# Patient Record
Sex: MALE | Race: WHITE | NOT HISPANIC OR LATINO | Employment: FULL TIME | URBAN - METROPOLITAN AREA
[De-identification: names, ages, dates, MRNs, and addresses within clinical notes are randomized per-mention and may not be internally consistent; named-entity substitution may affect disease eponyms.]

---

## 2020-12-26 ENCOUNTER — APPOINTMENT (OUTPATIENT)
Dept: RADIOLOGY | Facility: MEDICAL CENTER | Age: 30
End: 2020-12-26
Attending: EMERGENCY MEDICINE
Payer: MEDICAID

## 2020-12-26 ENCOUNTER — HOSPITAL ENCOUNTER (EMERGENCY)
Facility: MEDICAL CENTER | Age: 30
End: 2020-12-26
Attending: EMERGENCY MEDICINE
Payer: MEDICAID

## 2020-12-26 VITALS
TEMPERATURE: 97.9 F | SYSTOLIC BLOOD PRESSURE: 114 MMHG | DIASTOLIC BLOOD PRESSURE: 70 MMHG | WEIGHT: 205 LBS | OXYGEN SATURATION: 90 % | RESPIRATION RATE: 13 BRPM | HEART RATE: 90 BPM | HEIGHT: 78 IN | BODY MASS INDEX: 23.72 KG/M2

## 2020-12-26 DIAGNOSIS — S12.9XXA CLOSED FRACTURE OF TRANSVERSE PROCESS OF CERVICAL VERTEBRA, INITIAL ENCOUNTER (HCC): ICD-10-CM

## 2020-12-26 DIAGNOSIS — S12.8XXA FRACTURE OF LAMINA OF CERVICAL VERTEBRA, INITIAL ENCOUNTER (HCC): ICD-10-CM
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
## 2020-12-26 LAB
ABO GROUP BLD: NORMAL
ALBUMIN SERPL BCP-MCNC: 4.8 G/DL (ref 3.2–4.9)
ALBUMIN/GLOB SERPL: 1.7 G/DL
ALP SERPL-CCNC: 67 U/L (ref 30–99)
ALT SERPL-CCNC: 52 U/L (ref 2–50)
ANION GAP SERPL CALC-SCNC: 14 MMOL/L (ref 7–16)
APTT PPP: 28.3 SEC (ref 24.7–36)
AST SERPL-CCNC: 41 U/L (ref 12–45)
BILIRUB SERPL-MCNC: 0.2 MG/DL (ref 0.1–1.5)
BLD GP AB SCN SERPL QL: NORMAL
BUN SERPL-MCNC: 12 MG/DL (ref 8–22)
CALCIUM SERPL-MCNC: 9.2 MG/DL (ref 8.5–10.5)
CHLORIDE SERPL-SCNC: 105 MMOL/L (ref 96–112)
CO2 SERPL-SCNC: 24 MMOL/L (ref 20–33)
CREAT SERPL-MCNC: 1.01 MG/DL (ref 0.5–1.4)
ERYTHROCYTE [DISTWIDTH] IN BLOOD BY AUTOMATED COUNT: 43.5 FL (ref 35.9–50)
ETHANOL BLD-MCNC: 296.1 MG/DL (ref 0–10)
GLOBULIN SER CALC-MCNC: 2.8 G/DL (ref 1.9–3.5)
GLUCOSE SERPL-MCNC: 107 MG/DL (ref 65–99)
HCT VFR BLD AUTO: 50.7 % (ref 42–52)
HGB BLD-MCNC: 17 G/DL (ref 14–18)
INR PPP: 1 (ref 0.87–1.13)
MCH RBC QN AUTO: 30 PG (ref 27–33)
MCHC RBC AUTO-ENTMCNC: 33.5 G/DL (ref 33.7–35.3)
MCV RBC AUTO: 89.4 FL (ref 81.4–97.8)
PLATELET # BLD AUTO: 186 K/UL (ref 164–446)
PMV BLD AUTO: 10.8 FL (ref 9–12.9)
POTASSIUM SERPL-SCNC: 3.5 MMOL/L (ref 3.6–5.5)
PROT SERPL-MCNC: 7.6 G/DL (ref 6–8.2)
PROTHROMBIN TIME: 13.5 SEC (ref 12–14.6)
RBC # BLD AUTO: 5.67 M/UL (ref 4.7–6.1)
RH BLD: NORMAL
SODIUM SERPL-SCNC: 143 MMOL/L (ref 135–145)
WBC # BLD AUTO: 3.2 K/UL (ref 4.8–10.8)

## 2020-12-26 PROCEDURE — 85027 COMPLETE CBC AUTOMATED: CPT

## 2020-12-26 PROCEDURE — 80053 COMPREHEN METABOLIC PANEL: CPT

## 2020-12-26 PROCEDURE — 85610 PROTHROMBIN TIME: CPT

## 2020-12-26 PROCEDURE — 71045 X-RAY EXAM CHEST 1 VIEW: CPT

## 2020-12-26 PROCEDURE — 70450 CT HEAD/BRAIN W/O DYE: CPT

## 2020-12-26 PROCEDURE — 72125 CT NECK SPINE W/O DYE: CPT

## 2020-12-26 PROCEDURE — 72131 CT LUMBAR SPINE W/O DYE: CPT

## 2020-12-26 PROCEDURE — 86900 BLOOD TYPING SEROLOGIC ABO: CPT

## 2020-12-26 PROCEDURE — 80307 DRUG TEST PRSMV CHEM ANLYZR: CPT

## 2020-12-26 PROCEDURE — 90715 TDAP VACCINE 7 YRS/> IM: CPT | Performed by: EMERGENCY MEDICINE

## 2020-12-26 PROCEDURE — 72128 CT CHEST SPINE W/O DYE: CPT

## 2020-12-26 PROCEDURE — 700111 HCHG RX REV CODE 636 W/ 250 OVERRIDE (IP): Performed by: EMERGENCY MEDICINE

## 2020-12-26 PROCEDURE — 90471 IMMUNIZATION ADMIN: CPT

## 2020-12-26 PROCEDURE — 73030 X-RAY EXAM OF SHOULDER: CPT | Mod: LT

## 2020-12-26 PROCEDURE — 99285 EMERGENCY DEPT VISIT HI MDM: CPT

## 2020-12-26 PROCEDURE — 86901 BLOOD TYPING SEROLOGIC RH(D): CPT

## 2020-12-26 PROCEDURE — 96374 THER/PROPH/DIAG INJ IV PUSH: CPT

## 2020-12-26 PROCEDURE — 700117 HCHG RX CONTRAST REV CODE 255: Performed by: EMERGENCY MEDICINE

## 2020-12-26 PROCEDURE — 305948 HCHG GREEN TRAUMA ACT PRE-NOTIFY NO CC

## 2020-12-26 PROCEDURE — 85730 THROMBOPLASTIN TIME PARTIAL: CPT

## 2020-12-26 PROCEDURE — 71260 CT THORAX DX C+: CPT

## 2020-12-26 PROCEDURE — 86850 RBC ANTIBODY SCREEN: CPT

## 2020-12-26 RX ORDER — HALOPERIDOL 5 MG/ML
5 INJECTION INTRAMUSCULAR
Status: DISCONTINUED | OUTPATIENT
Start: 2020-12-26 | End: 2020-12-26 | Stop reason: HOSPADM

## 2020-12-26 RX ORDER — MORPHINE SULFATE 4 MG/ML
4 INJECTION, SOLUTION INTRAMUSCULAR; INTRAVENOUS
Status: DISCONTINUED | OUTPATIENT
Start: 2020-12-26 | End: 2020-12-26 | Stop reason: HOSPADM

## 2020-12-26 RX ORDER — IBUPROFEN 600 MG/1
600 TABLET ORAL EVERY 6 HOURS PRN
Qty: 30 TAB | Refills: 0 | Status: SHIPPED | OUTPATIENT
Start: 2020-12-26

## 2020-12-26 RX ORDER — ACETAMINOPHEN 500 MG
500-1000 TABLET ORAL EVERY 6 HOURS PRN
Qty: 30 TAB | Refills: 0 | Status: SHIPPED | OUTPATIENT
Start: 2020-12-26

## 2020-12-26 RX ADMIN — CLOSTRIDIUM TETANI TOXOID ANTIGEN (FORMALDEHYDE INACTIVATED), CORYNEBACTERIUM DIPHTHERIAE TOXOID ANTIGEN (FORMALDEHYDE INACTIVATED), BORDETELLA PERTUSSIS TOXOID ANTIGEN (GLUTARALDEHYDE INACTIVATED), BORDETELLA PERTUSSIS FILAMENTOUS HEMAGGLUTININ ANTIGEN (FORMALDEHYDE INACTIVATED), BORDETELLA PERTUSSIS PERTACTIN ANTIGEN, AND BORDETELLA PERTUSSIS FIMBRIAE 2/3 ANTIGEN 0.5 ML: 5; 2; 2.5; 5; 3; 5 INJECTION, SUSPENSION INTRAMUSCULAR at 07:53

## 2020-12-26 RX ADMIN — IOHEXOL 100 ML: 350 INJECTION, SOLUTION INTRAVENOUS at 04:30

## 2020-12-26 RX ADMIN — MORPHINE SULFATE 4 MG: 4 INJECTION INTRAVENOUS at 08:03

## 2020-12-26 NOTE — ED NOTES
Assumed care of patient at this time from BAILEY Cabrales.  Traction to place [place] J collar, patient resting comfortably at this time, will continue to monitor
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
## 2020-12-26 NOTE — DISCHARGE INSTRUCTIONS
Please discuss the following findings with your neurosurgeon:  DX-SHOULDER 2+ LEFT   Final Result      No evidence of acute fracture or dislocation.      CT-CHEST,ABDOMEN,PELVIS WITH   Final Result      No solid organ or vascular injury is identified.      No pulmonary contusion or pneumothorax is seen.      Soft tissue density in the anterior mediastinum most likely represents residual thymus as opposed to a small anterior mediastinal hematoma      Fracture of the C5 lamina on the right is partially imaged.      Hyperenhancing lesion within the left lobe of the liver may represent a hemangioma or FNH. Further evaluation can be performed with hepatic protocol MRI.      CT-LSPINE W/O PLUS RECONS   Final Result      No fracture or subluxation is seen in the lumbar spine.      CT-TSPINE W/O PLUS RECONS   Final Result      No fracture or subluxation is seen in the thoracic spine.         CT-CSPINE WITHOUT PLUS RECONS   Final Result      Fracture involving the lamina, transverse process and inferior facet of C5 on the right. Slight widening of the facet joint at C5/C6 on the right.      CT-HEAD W/O   Final Result      No acute intracranial abnormality is identified.      DX-CHEST-LIMITED (1 VIEW)   Final Result      No acute cardiopulmonary process is identified.
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
## 2020-12-26 NOTE — ED NOTES
Traction nurses at bedside to place [place] J neck collar.  Patient awake and talking, requesting to have his girlfriend Negra ([phone]) called.

## 2020-12-26 NOTE — ED NOTES
Patient's mother and transport here for discharge.  Patient dressed and sitting at side of bed, [place] MIGUEL neck brace in place.  Discharge instructions, pain medications and proper care of neck brace reviewed with patient and family.  All questions answered, patient was able to ambulate off unit in NAD with family member

## 2021-05-03 ENCOUNTER — TELEPHONE (OUTPATIENT)
Dept: SCHEDULING | Facility: IMAGING CENTER | Age: 31
End: 2021-05-03

## 2021-05-11 ENCOUNTER — APPOINTMENT (OUTPATIENT)
Dept: MEDICAL GROUP | Facility: LAB | Age: 31
End: 2021-05-11
Payer: MEDICAID

## 2022-05-19 ENCOUNTER — OFFICE VISIT (OUTPATIENT)
Dept: MEDICAL GROUP | Facility: IMAGING CENTER | Age: 32
End: 2022-05-19
Payer: COMMERCIAL

## 2022-05-19 ENCOUNTER — HOSPITAL ENCOUNTER (OUTPATIENT)
Facility: MEDICAL CENTER | Age: 32
End: 2022-05-19
Payer: COMMERCIAL

## 2022-05-19 ENCOUNTER — TELEPHONE (OUTPATIENT)
Dept: SCHEDULING | Facility: IMAGING CENTER | Age: 32
End: 2022-05-19

## 2022-05-19 VITALS
WEIGHT: 205 LBS | TEMPERATURE: 98.4 F | HEIGHT: 78 IN | HEART RATE: 93 BPM | DIASTOLIC BLOOD PRESSURE: 62 MMHG | RESPIRATION RATE: 16 BRPM | OXYGEN SATURATION: 96 % | SYSTOLIC BLOOD PRESSURE: 116 MMHG | BODY MASS INDEX: 23.72 KG/M2

## 2022-05-19 DIAGNOSIS — Z00.00 WELLNESS EXAMINATION: ICD-10-CM

## 2022-05-19 DIAGNOSIS — J02.9 PHARYNGITIS, UNSPECIFIED ETIOLOGY: ICD-10-CM

## 2022-05-19 DIAGNOSIS — J30.2 SEASONAL ALLERGIES: ICD-10-CM

## 2022-05-19 DIAGNOSIS — R09.81 NASAL CONGESTION: ICD-10-CM

## 2022-05-19 DIAGNOSIS — J06.9 UPPER RESPIRATORY TRACT INFECTION, UNSPECIFIED TYPE: ICD-10-CM

## 2022-05-19 DIAGNOSIS — R68.89 FLU-LIKE SYMPTOMS: ICD-10-CM

## 2022-05-19 PROBLEM — R52 BODY ACHES: Status: ACTIVE | Noted: 2022-05-19

## 2022-05-19 LAB
COVID ORDER STATUS COVID19: NORMAL
EXTERNAL QUALITY CONTROL: NORMAL
FLUAV+FLUBV AG SPEC QL IA: NEGATIVE
HETEROPH AB SER QL LA: NEGATIVE
INT CON NEG: NORMAL
INT CON POS: NORMAL
S PYO AG THROAT QL: NEGATIVE
SARS-COV+SARS-COV-2 AG RESP QL IA.RAPID: NEGATIVE

## 2022-05-19 PROCEDURE — U0003 INFECTIOUS AGENT DETECTION BY NUCLEIC ACID (DNA OR RNA); SEVERE ACUTE RESPIRATORY SYNDROME CORONAVIRUS 2 (SARS-COV-2) (CORONAVIRUS DISEASE [COVID-19]), AMPLIFIED PROBE TECHNIQUE, MAKING USE OF HIGH THROUGHPUT TECHNOLOGIES AS DESCRIBED BY CMS-2020-01-R: HCPCS

## 2022-05-19 PROCEDURE — 87880 STREP A ASSAY W/OPTIC: CPT

## 2022-05-19 PROCEDURE — U0005 INFEC AGEN DETEC AMPLI PROBE: HCPCS

## 2022-05-19 PROCEDURE — 87077 CULTURE AEROBIC IDENTIFY: CPT

## 2022-05-19 PROCEDURE — 86308 HETEROPHILE ANTIBODY SCREEN: CPT

## 2022-05-19 PROCEDURE — 99203 OFFICE O/P NEW LOW 30 MIN: CPT

## 2022-05-19 PROCEDURE — 87426 SARSCOV CORONAVIRUS AG IA: CPT

## 2022-05-19 PROCEDURE — 87804 INFLUENZA ASSAY W/OPTIC: CPT

## 2022-05-19 PROCEDURE — 87070 CULTURE OTHR SPECIMN AEROBIC: CPT

## 2022-05-19 ASSESSMENT — ANXIETY QUESTIONNAIRES
4. TROUBLE RELAXING: NOT AT ALL
2. NOT BEING ABLE TO STOP OR CONTROL WORRYING: NOT AT ALL
1. FEELING NERVOUS, ANXIOUS, OR ON EDGE: NOT AT ALL
GAD7 TOTAL SCORE: 0
5. BEING SO RESTLESS THAT IT IS HARD TO SIT STILL: NOT AT ALL
3. WORRYING TOO MUCH ABOUT DIFFERENT THINGS: NOT AT ALL
6. BECOMING EASILY ANNOYED OR IRRITABLE: NOT AT ALL
7. FEELING AFRAID AS IF SOMETHING AWFUL MIGHT HAPPEN: NOT AT ALL

## 2022-05-19 ASSESSMENT — PATIENT HEALTH QUESTIONNAIRE - PHQ9: CLINICAL INTERPRETATION OF PHQ2 SCORE: 0

## 2022-05-19 ASSESSMENT — FIBROSIS 4 INDEX: FIB4 SCORE: 0.98

## 2022-05-19 NOTE — PATIENT INSTRUCTIONS
My recommendations for an upper respiratory infection:  - Use a humidifier, especially at night. Cold or warm water humidifiers have the same effect.  - Aaron Med squeeze bottle sinus rinses or plain nasal saline twice a day.  - Hot tea + honey + fresh lemon juice  - Honey by itself has been shown to help provide cough relief  - Frequent hand washing, rest, hydration  - OTC tylenol and ibuprofen as needed for pain and fever  - OTC throat lozenges  - OTC throat sprays    Follow up with primary care provider. Urgently for worsening symptoms, persistent fevers, facial swelling, visual changes, weakness, elevated heart rate, stiff neck, prolonged cough, persistent wheezing, leg swelling, or any other concerns. Seek emergency medical care immediately for: Trouble breathing, persistent pain or pressure in the chest, confusion, inability to wake or stay awake, bluish lips or face, persistent tachycardia (fast heart rate), prolonged dizziness, persistent high grade fevers.

## 2022-05-19 NOTE — LETTER
May 19, 2022         Patient: Willem Flowers   YOB: 1990   Date of Visit: 5/19/2022           To Whom it May Concern:    Willem Flowers was seen in my clinic on 5/19/2022.     If you have any questions or concerns, please don't hesitate to call.        Sincerely,           JESSIE Francisco  Electronically Signed

## 2022-05-19 NOTE — PROGRESS NOTES
CC:  Establish care    HISTORY OF THE PRESENT ILLNESS: Patient is a 32 y.o. male. This pleasant patient is here today to establish care    Patient presents today with a 5 day history of fatigue, lethargy, body aches, night sweats, and headache. On Tuesday, patient developed sinus pressure, sinus congestion, rhinorrhea, and sore throat. Patient reports of having worsening symptoms particularly his sore throat. He states that it is becoming painful to swallow. Patient admits to being active and has gone on walks and has gone to the gym for a light work-out hoping this would help. Patient admits to not drinking enough water which contributed to him feeling worse. His symptoms are bothersome enough that it is impacting his ability to sleep.   Patient reports of his parents whom he lives with of having Covid.   Patient uses Ibuprofen and warm water and salt rinses, and Nedi Pot sinus rinses which provided some relief.   Patient denies fevers, malaise, facial pain, cough, shortness of breath, difficulty breathing, chest pain, dizziness, syncope, n/v, adb pain, bowel changes.    Patient endorses to taking Claritin for seasonal allergies which are not helping. He is requesting allergy testing.       Allergies: Patient has no known allergies.    Current Outpatient Medications Ordered in Epic   Medication Sig Dispense Refill   • acetaminophen (TYLENOL) 500 MG Tab Take 1-2 Tabs by mouth every 6 hours as needed. 30 Tab 0   • ibuprofen (MOTRIN) 600 MG Tab Take 1 Tab by mouth every 6 hours as needed. 30 Tab 0     No current Epic-ordered facility-administered medications on file.       History reviewed. No pertinent past medical history.    History reviewed. No pertinent surgical history.    Social History     Tobacco Use   • Smoking status: Former Smoker     Quit date:      Years since quittin.3   • Smokeless tobacco: Never Used   Vaping Use   • Vaping Use: Former   • Quit date: 2021   • Substances: Nicotine,  "Flavoring   • Devices: Disposable   Substance Use Topics   • Alcohol use: Not Currently   • Drug use: Not Currently     Frequency: 2.0 times per week     Types: Inhaled, Marijuana     Comment: \"weed\"       Social History     Social History Narrative   • Not on file       Family History   Problem Relation Age of Onset   • Hypertension Father    • Blood Disease Father    • No Known Problems Brother    • No Known Problems Brother    • Breast Cancer Maternal Grandmother    • Dementia Maternal Grandfather    • Blood Disease Paternal Grandmother    • No Known Problems Paternal Grandfather    • No Known Problems Daughter    • Colorectal Cancer Son    • Peritoneal Cancer Son    • Tubal Cancer Son    • Ovarian Cancer Son        ROS:     - Constitutional: Negative for fever, unexpected weight change, + chills, sweats, fatigue/generalized weakness.     - HEENT: no vision changes, no hearing changes, ear pain, + headaches, rhinorrhea, sinus congestion, sore throat, neck pain    - Respiratory: Negative for cough, sputum production, chest congestion, dyspnea, wheezing, and crackles.      - Cardiovascular: Negative for chest pain, palpitations, orthopnea, and bilateral lower extremity edema.     - Gastrointestinal: Negative for heartburn, nausea, vomiting, abdominal pain, hematochezia, melena, diarrhea, constipation    - Genitourinary: Negative for dysuria, polyuria, hematuria, pyuria, urinary urgency, and urinary incontinence.     - Musculoskeletal: Negative for myalgias, back pain, and joint pain.     - Skin: Negative for rash, itching, cyanotic skin color change.     - Neurological: Negative for dizziness, tingling, tremors, focal sensory deficit, focal weakness and headaches.     - Endo/Heme/Allergies: Does not bruise/bleed easily.     - Psychiatric/Behavioral: Negative for depression, suicidal/homicidal ideation and memory loss.        - NOTE: All other systems reviewed and are negative, except as in HPI.      .  Exam: BP " "116/62 (BP Location: Left arm, Patient Position: Sitting, BP Cuff Size: Adult)   Pulse 93   Temp 36.9 °C (98.4 °F) (Temporal)   Resp 16   Ht 1.981 m (6' 6\")   Wt 93 kg (205 lb)   SpO2 96%  Body mass index is 23.69 kg/m².    General: Normal appearing. No distress.  HEENT: Normocephalic. Eyes conjunctiva clear lids without ptosis, pupils equal and reactive to light accommodation, ears normal shape and contour, canals are clear bilaterally, tympanic membranes are benign, +mucopurulent sinus drainage,  oropharynx is with erythema, edema or some white patches.   Neck: Supple without JVD. Thyroid is not enlarged.  Pulmonary: Clear to ausculation.  Normal effort. No rales, ronchi, or wheezing.  Cardiovascular: Regular rate and rhythm without murmur. Carotid and radial pulses are intact and equal bilaterally.  Abdomen: Soft, nontender, nondistended. Normal bowel sounds.  Neurologic: Grossly nonfocal  Lymph: Anterior and posterior cervial lymph nodes are palpable  Skin: Warm and dry.  No obvious lesions.  Musculoskeletal: Normal gait. No extremity cyanosis, clubbing, or edema.  Psych: Normal mood and affect. Alert and oriented x3. Judgment and insight is normal.    Please note that this dictation was created using voice recognition software. I have made every reasonable attempt to correct obvious errors, but I expect that there are errors of grammar and possibly content that I did not discover before finalizing the note.      Assessment/Plan    32 y.o. male with the following -    1. Wellness examination  Patient here to establish care. Patient has not had any routine lab work in the past. Patient does not have any significant personal or family history but would like to get some labs done. Discussed this with him, patient agreeable to this but will also check with insurance first prior to getting labs drawn.    - CBC WITH DIFFERENTIAL; Future  - Comp Metabolic Panel; Future  - Lipid Profile; Future  - VITAMIN D,25 " HYDROXY; Future  - HEMOGLOBIN A1C; Future    2. Upper respiratory tract infection, unspecified type  4. Pharyngitis  3. Flu-like symptoms  5. Nasal congestion  New issue with worsening sore throat. History of recent exposure to Covid. Patient's vital signs are within stable range. Patient appears nontoxic.  Differentials viral URI, Covid, strep pharyngitis, Mono, Influenza. All tests negative in office. Will send for COVID PCR and throat culture.  If throat culture comes back positive with strep, will start ABX per protocol.   In the meantime, I recommend supportive measures including humidifier, warm salt water gargles, over-the-counter Cepacol throat lozenges, throat sprays, nasal saline rinses, rest  and increased fluids. Advised patient to take OTC immune booster supplementation. Take Tylenol or Ibuprofen as needed for pain and fever relief.   Pt was encouraged to seek treatment back in the ER or urgent care for worsening symptoms,  fever greater than 100.5, wheezes or shortness of breath.    - POCT Influenza A/B  - POCT Rapid Strep A  - POCT SARS-COV Antigen ALISIA Manual Result  - POCT Mononucleosis (mono)  - CULTURE THROAT; Future  - SARS-CoV-2, PCR (In-House); Future    6. Seasonal allergies  Established issue. Unknown triggers. Patient requesting allergy testing for this to find out exact triggers.    - Referral to Allergy      Referral for genetic research was offered. Patient declined.    Return in about 1 year (around 5/19/2023), or if symptoms worsen or fail to improve.    Please note that this dictation was created using voice recognition software. I have made every reasonable attempt to correct obvious errors, but I expect that there are errors of grammar and possibly content that I did not discover before finalizing the note.

## 2022-05-20 LAB
SARS-COV-2 RNA RESP QL NAA+PROBE: NOTDETECTED
SPECIMEN SOURCE: NORMAL

## 2022-05-22 ENCOUNTER — APPOINTMENT (OUTPATIENT)
Dept: URGENT CARE | Facility: CLINIC | Age: 32
End: 2022-05-22
Payer: COMMERCIAL

## 2022-05-22 LAB
BACTERIA SPEC RESP CULT: NORMAL
SIGNIFICANT IND 70042: NORMAL
SITE SITE: NORMAL
SOURCE SOURCE: NORMAL

## 2022-05-23 ENCOUNTER — OFFICE VISIT (OUTPATIENT)
Dept: MEDICAL GROUP | Facility: IMAGING CENTER | Age: 32
End: 2022-05-23
Payer: COMMERCIAL

## 2022-05-23 ENCOUNTER — TELEPHONE (OUTPATIENT)
Dept: MEDICAL GROUP | Facility: IMAGING CENTER | Age: 32
End: 2022-05-23

## 2022-05-23 VITALS
TEMPERATURE: 99.7 F | DIASTOLIC BLOOD PRESSURE: 62 MMHG | OXYGEN SATURATION: 96 % | RESPIRATION RATE: 16 BRPM | HEIGHT: 78 IN | SYSTOLIC BLOOD PRESSURE: 102 MMHG | HEART RATE: 97 BPM | BODY MASS INDEX: 23.72 KG/M2 | WEIGHT: 205 LBS

## 2022-05-23 DIAGNOSIS — J02.9 PHARYNGITIS, UNSPECIFIED ETIOLOGY: ICD-10-CM

## 2022-05-23 DIAGNOSIS — R05.9 COUGH: ICD-10-CM

## 2022-05-23 DIAGNOSIS — R53.83 OTHER FATIGUE: ICD-10-CM

## 2022-05-23 PROCEDURE — 99213 OFFICE O/P EST LOW 20 MIN: CPT

## 2022-05-23 RX ORDER — AMOXICILLIN AND CLAVULANATE POTASSIUM 875; 125 MG/1; MG/1
1 TABLET, FILM COATED ORAL 2 TIMES DAILY
Qty: 20 TABLET | Refills: 0 | Status: SHIPPED | OUTPATIENT
Start: 2022-05-23 | End: 2022-06-02

## 2022-05-23 ASSESSMENT — FIBROSIS 4 INDEX: FIB4 SCORE: 0.98

## 2022-05-23 ASSESSMENT — PAIN SCALES - GENERAL: PAINLEVEL: 5=MODERATE PAIN

## 2022-05-23 NOTE — PROGRESS NOTES
"Subjective:     CC:   Chief Complaint   Patient presents with   • Pharyngitis     X 2 weeks,not getting better, cousin tested for Strep C    • Congestion     In chest        HPI:   Willem presents today to discuss:    Patient reports of ongoing fatigue, sinus congestion, headache, sore throat, and now with productive cough. Patient states productive cough started this am. However, his most bothersome symptom is the peristent sore throat. Patient reports of painful swallowing; however, denies having difficulty swallowing. Patient's sore throat is worse immediately after awakening and slightly improves towards the end of the day. Patient now having some bilateral ear pain \"feels like pulsating.\" Patient unable to state whether he's had fever or chills due to daily use of Ibuprofen and Tylenol. Patient also reports that when throat culture was obtained last office visit that the left side of his throat was swabbed not the middle where most of the \"white patches were.\"  Patient endorses to using nasal rinses, day/nightQuil, Ibuprofen and Tylenol daily which helps. Patient using warm and salt water gargles without relief.   Patient denies fevers, chills, neck pain or stiffness, facial pain, changes in hearing, tinnitus, trismus, difficulty swallowing, drooling, voice changes, difficulty breathing, shortness of breath, pleuritic pain, chest pain, palpitations, dizziness or syncope.      No past medical history on file.  Family History   Problem Relation Age of Onset   • Hypertension Father    • Blood Disease Father    • No Known Problems Brother    • No Known Problems Brother    • Breast Cancer Maternal Grandmother    • Dementia Maternal Grandfather    • Blood Disease Paternal Grandmother    • No Known Problems Paternal Grandfather    • No Known Problems Daughter    • Colorectal Cancer Son    • Peritoneal Cancer Son    • Tubal Cancer Son    • Ovarian Cancer Son      No past surgical history on file.  Social History " "    Tobacco Use   • Smoking status: Former Smoker     Quit date:      Years since quittin.3   • Smokeless tobacco: Never Used   Vaping Use   • Vaping Use: Former   • Quit date: 2021   • Substances: Nicotine, Flavoring   • Devices: Disposable   Substance Use Topics   • Alcohol use: Not Currently   • Drug use: Not Currently     Frequency: 2.0 times per week     Types: Inhaled, Marijuana     Comment: \"weed\"     Social History     Social History Narrative   • Not on file     Current Outpatient Medications Ordered in Epic   Medication Sig Dispense Refill   • amoxicillin-clavulanate (AUGMENTIN) 875-125 MG Tab Take 1 Tablet by mouth 2 times a day for 10 days. 20 Tablet 0   • acetaminophen (TYLENOL) 500 MG Tab Take 1-2 Tabs by mouth every 6 hours as needed. 30 Tab 0   • ibuprofen (MOTRIN) 600 MG Tab Take 1 Tab by mouth every 6 hours as needed. 30 Tab 0     No current Epic-ordered facility-administered medications on file.     Patient has no known allergies.    ROS: see hpi  Gen: no fevers/chills, +fatigue  Pulm: no sob, +cough, sore throat, rhinorrhea, nasal congestion  CV: no chest pain, no palpitations, no edema  GI: no nausea/vomiting, no diarrhea  Skin: no rash    Objective:   Exam:  /62   Pulse 97   Temp 37.6 °C (99.7 °F)   Resp 16   Ht 1.981 m (6' 6\")   Wt 93 kg (205 lb)   SpO2 96%   BMI 23.69 kg/m²    Body mass index is 23.69 kg/m².    Gen: Alert and oriented, No apparent distress.  HEENT: Head atraumatic, normocephalic. Pupils equal and round. +mucopurulent sinus drainage,  oropharynx is with erythema, edema, and white patches with scant exudate in the posterior pharynx.  Ear canals with minimal ear cerumen, bilaterally. TM benign, bilaterally.  Neck: Neck is supple. Anterior and posterior cervial lymph nodes are palpable  Lungs: Normal effort, CTA bilaterally, no wheezes, rhonchi, or rales  CV: Regular rate and rhythm. No murmurs, rubs, or gallops.  ABD: +BS. Non-tender, non-distended. No " rebound, rigidity, or guarding.  Ext: No clubbing, cyanosis, edema.    Assessment & Plan:     32 y.o. male with the following -     1. Pharyngitis, unspecified etiology  3. Cough  Persistent issue x 8 days remains unchanged with supportive measures.   Covid PCR was negative. Throat culture was negative. Patient reports throat culture not swabbed appropriately.  Physical examination likely pharyngitis. DD: pharyngitis, viral URI, sinusitis, bronchitis, peritonsillar cellulitis. Patient non-toxic appearing. Temp 99.7 in office which is elevated compared to previous office visit. Will treat empirically with oral antibiotics, side effects discussed. Encouraged to increase fluid intake, continue with supportive measures. If symptoms do not improve, will obtain another throat culture. Advised to follow up if symptoms do not improve. ER symptoms discussed.     - amoxicillin-clavulanate (AUGMENTIN) 875-125 MG Tab; Take 1 Tablet by mouth 2 times a day for 10 days.  Dispense: 20 Tablet; Refill: 0    2. Other fatigue  Ongoing x 8 days. Likely related to above issue. Lab work still pending, advised patient to complete.    Medical Decision Making/Course:  In the course of preparing for this visit with review of the pertinent past medical history, recent and past clinic visits, current medications, and performing chart, immunization, medical history and medication reconciliation, and in the further course of obtaining the current history pertinent to the clinic visit today, performing an exam and evaluation, ordering and independently evaluating labs, tests, and/or procedures, prescribing any recommended new medications as noted above, providing any pertinent counseling and education and recommending further coordination of care. This was discussed with patient in a shared-decision making conversation, and they understand and agreed with plan of care.    Return if symptoms worsen or fail to improve.    JESSIE Francisco    [facility]    Please note that this dictation was created using voice recognition software. I have made every reasonable attempt to correct obvious errors, but I expect that there are errors of grammar and possibly content that I did not discover before finalizing the note.

## 2022-05-23 NOTE — TELEPHONE ENCOUNTER
----- Message from Kailey Jalloh sent at 5/23/2022 10:09 AM PDT -----  Regarding: Rx pharmacy  Pt left stating that his pharmacy needs to be switched to the [facility] at [id number] [address] [place]. Please send his rx there for amoxicilin    Thank you

## 2022-05-23 NOTE — LETTER
[facility]  [address]  [place] [address]     May 23, 2022    Patient: Willem Flowers   YOB: 1990   Date of Visit: 5/23/2022       To Whom It May Concern:    Willem Flowers was seen and treated in our department on 5/23/2022. Patient is requiring antibiotic treatment for this. Patient requires plenty of rest to recover. Patient may return to work 5/26/2022 as long as you don't develop any fevers. If you have any questions, please call us at [phone]    Sincerely,     ANJELICA Francisco.

## 2023-02-14 ENCOUNTER — OFFICE VISIT (OUTPATIENT)
Dept: MEDICAL GROUP | Facility: IMAGING CENTER | Age: 33
End: 2023-02-14
Payer: COMMERCIAL

## 2023-02-14 VITALS
SYSTOLIC BLOOD PRESSURE: 116 MMHG | BODY MASS INDEX: 23.6 KG/M2 | HEART RATE: 63 BPM | WEIGHT: 204 LBS | DIASTOLIC BLOOD PRESSURE: 74 MMHG | OXYGEN SATURATION: 97 % | RESPIRATION RATE: 16 BRPM | TEMPERATURE: 98.1 F | HEIGHT: 78 IN

## 2023-02-14 DIAGNOSIS — K62.89 RECTAL PAIN: ICD-10-CM

## 2023-02-14 DIAGNOSIS — Z00.00 WELLNESS EXAMINATION: ICD-10-CM

## 2023-02-14 DIAGNOSIS — Z13.29 SCREENING FOR THYROID DISORDER: ICD-10-CM

## 2023-02-14 DIAGNOSIS — Z13.1 DIABETES MELLITUS SCREENING: ICD-10-CM

## 2023-02-14 DIAGNOSIS — Z13.6 SCREENING FOR CARDIOVASCULAR CONDITION: ICD-10-CM

## 2023-02-14 DIAGNOSIS — K59.00 CONSTIPATION, UNSPECIFIED CONSTIPATION TYPE: ICD-10-CM

## 2023-02-14 DIAGNOSIS — R10.11 RIGHT UPPER QUADRANT PAIN: ICD-10-CM

## 2023-02-14 DIAGNOSIS — Z13.21 ENCOUNTER FOR VITAMIN DEFICIENCY SCREENING: ICD-10-CM

## 2023-02-14 DIAGNOSIS — Z13.220 SCREENING FOR CHOLESTEROL LEVEL: ICD-10-CM

## 2023-02-14 DIAGNOSIS — Z13.0 SCREENING FOR DEFICIENCY ANEMIA: ICD-10-CM

## 2023-02-14 DIAGNOSIS — R14.0 BLOATING: ICD-10-CM

## 2023-02-14 PROCEDURE — 99214 OFFICE O/P EST MOD 30 MIN: CPT

## 2023-02-14 ASSESSMENT — PATIENT HEALTH QUESTIONNAIRE - PHQ9: CLINICAL INTERPRETATION OF PHQ2 SCORE: 0

## 2023-02-14 ASSESSMENT — PAIN SCALES - GENERAL: PAINLEVEL: 10=SEVERE PAIN

## 2023-02-14 NOTE — PROGRESS NOTES
Subjective:     CC:   Chief Complaint   Patient presents with    GI Problem     Pt reported -constipation, bloating and abnormal  about couple of weeks       HPI:   Willem presents today to discuss:    GI Problem  Patient reports developing constipation and bloating that started ~2 weeks ago.  He is having BM once a day in am which is hard, but today is starting to become more soft after taking Metamucil.  He usually goes at least is 2-3 times a day.   Patient reports of having intermittent excruciating rectal pain.  His symptoms with wax and wane.  This has been an ongoing issue for him since he was a teenager.  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  He denies bloody stool.    Patient admits that he was partying hard over the last 2 weekends. He was drinking a substantial amount of hard alcohol and was also on cocaine. This is around the time his symptoms started.  Patient tried Metamucil last night which was helpful.  He denies:  -Fevers, chills, lethargy, malaise  -Hematemesis, melena, hematochezia,   -Anorexia  -Unexplained weight loss  -Difficulty swallowing or pain with swallowing  -Family history of GI cancer  -Dyspepsia, abdominal discomfort-burning sensation or feeling full too quickly      History reviewed. No pertinent past medical history.  Family History   Problem Relation Age of Onset    Hypertension Father     Blood Disease Father     No Known Problems Brother     No Known Problems Brother     Breast Cancer Maternal Grandmother     Dementia Maternal Grandfather     Blood Disease Paternal Grandmother     No Known Problems Paternal Grandfather     No Known Problems Daughter     Colorectal Cancer Son     Peritoneal Cancer Son     Tubal Cancer Son     Ovarian Cancer Son      History reviewed. No pertinent surgical history.  Social History     Tobacco Use    Smoking status: Former     Types: Cigarettes     Quit date: 2020     Years since  "quitting: 3.1    Smokeless tobacco: Never   Vaping Use    Vaping Use: Former    Quit date: 11/19/2021    Substances: Nicotine, Flavoring    Devices: Disposable   Substance Use Topics    Alcohol use: Not Currently    Drug use: Not Currently     Frequency: 2.0 times per week     Types: Inhaled, Marijuana     Comment: \"weed\"     Social History     Social History Narrative    Not on file     Current Outpatient Medications Ordered in Epic   Medication Sig Dispense Refill    acetaminophen (TYLENOL) 500 MG Tab Take 1-2 Tabs by mouth every 6 hours as needed. 30 Tab 0    ibuprofen (MOTRIN) 600 MG Tab Take 1 Tab by mouth every 6 hours as needed. 30 Tab 0     No current Epic-ordered facility-administered medications on file.     Patient has no known allergies.    ROS: see hpi  Gen: no fevers/chills  Pulm: no sob, no cough  CV: no chest pain, no palpitations, no edema  GI: no nausea/vomiting, no diarrhea  Skin: no rash    Objective:   Exam:  /74 (BP Location: Left arm, Patient Position: Sitting, BP Cuff Size: Adult)   Pulse 63   Temp 36.7 °C (98.1 °F) (Temporal)   Resp 16   Ht 1.98 m (6' 5.95\")   Wt 92.5 kg (204 lb)   SpO2 97%   BMI 23.60 kg/m²    Body mass index is 23.6 kg/m².    Gen: Alert and oriented, No apparent distress.  HEENT: Head atraumatic, normocephalic. Pupils equal and round.  Neck: Neck is supple without lymphadenopathy.   Lungs: Normal effort, CTA bilaterally, no wheezes, rhonchi, or rales  CV: Regular rate and rhythm. No murmurs, rubs, or gallops.  ABD: Hypoactive bowel sounds. +Distended. Tender RUQ and epigastric area. No rebound, rigidity, or guarding.  Psoas sign: negative  Obturator test: negative  Markle's sign: negative  Dannie's signs: negative  Grey-Black's sign: negative  Kehr's signs: negative  Dubois's signs: negative  Ext: No clubbing, cyanosis, edema.  Skin: slightly jaundiced  Assessment & Plan:     33 y.o. male with the following -     1. Right upper quadrant pain  2. Bloating  New " and uncontrolled condition.  Presentation suspicious for GERD, pancreatitis, biliary or hepatic etiology.  Recommend lifestyle and dietary measures include:  -Eliminate dietary triggers such as: caffeine, chocolate, spicy food, fatty/fried foods, carbonated beverages, peppermint and acidic food or a drinks  -Avoid tobacco and alcohol use  -Decrease or eliminate use of NSAIDs and Tylenol  -Start Low Fodmop diet  -Start with counter antacids such as TUMS as needed for symptom relief  -Use histamine 2 receptor antagonist such as Pepcid regularly up to 4 to 6 weeks  -If symptoms do not improve with use of TUMS and Pepcid, may start omeprazole such as Nexium or Prilosec for up to 8 weeks.  Will order imaging and labs to further evaluate.  Place referral to GI.  ED symptoms discussed.    - CALPROTECTIN,FECAL; Future  - US-ABDOMEN COMPLETE SURVEY; Future  - Referral to Gastroenterology  - HEPATITIS PANEL ACUTE(4 COMPONENTS); Future  - GAMMA GT (GGT); Future  - AMYLASE; Future  - LIPASE; Future    3. Constipation, unspecified constipation type  4. Rectal pain  Acute on chronic condition.  Patient presentation likely due to poor lifestyle choices.  Differentials include IBS, diverticulosis, or worsening hemorrhoids.  Will order ultrasound of abdomen and labs for further evaluation.  Will place referral to GI.  Recommend:   1)  Increasing water intake to about 80 ounces a day (a bit more than a half gallon of water) can help. Decrease intake of sweets, refined cereals and bread, pastries, and sugar.  In addition, regular exercise can make an enormous difference besides being generally good for you anyway.  Making sure your diet includes plenty of fruits and vegetables is also very helpful.    2)  Adding additional fiber to your diet with products like metamucil, benefiber, citrucel, or psyllium can help by adding bulk to your stool, keeping it from getting quite so packed down.    3) Polyethylene glycol (Miralax or its  generic equivalent) is an osmotic laxative, meaning it brings extra water into your colon, helping keep your stool from getting hard and packed down.  One capful a day should give normal soft stool within about 2-3 days.  If not, consider trying 1 capful twice a day.    4) Milk of magnesia (30 ml daily) or magnesium citrate (1/2 to 1 bottle daily), or a bisacodyl (Dulcolax) suppository can be used next to get things moving.    5) If this has been ineffective, using Senna-S, 1-2 tablets one to two times daily can be helpful.    6) If all these measures have been ineffective, you can try a mineral oil enema or a warm tap water enema to see if this helps things.    - US-ABDOMEN COMPLETE SURVEY; Future  - OCCULT BLOOD FECES IMMUNOASSAY; Future  - CALPROTECTIN,FECAL; Future  - Referral to Gastroenterology  - HEPATITIS PANEL ACUTE(4 COMPONENTS); Future    5. Wellness examination  Patient is due for annual labs. Will screen for anemia, thyroid disorder, metabolic disorder, cardiovascular disease, diabetes, and vitamin deficiency. Will order labs.    - Lipid Profile; Future  - TSH WITH REFLEX TO FT4; Future  - VITAMIN D,25 HYDROXY (DEFICIENCY); Future  - HEMOGLOBIN A1C; Future  - Comp Metabolic Panel; Future  - CBC WITH DIFFERENTIAL; Future    6. Diabetes mellitus screening  - HEMOGLOBIN A1C; Future    7. Screening for thyroid disorder  - TSH WITH REFLEX TO FT4; Future    8. Screening for cholesterol level  - Lipid Profile; Future    9. Encounter for vitamin deficiency screening  - VITAMIN D,25 HYDROXY (DEFICIENCY); Future    10. Screening for deficiency anemia  - CBC WITH DIFFERENTIAL; Future    11. Screening for cardiovascular condition  - Lipid Profile; Future  - Comp Metabolic Panel; Future    Medical Decision Making/Course:  In the course of preparing for this visit with review of the pertinent past medical history, recent and past clinic visits, current medications, and performing chart, immunization, medical history  and medication reconciliation, and in the further course of obtaining the current history pertinent to the clinic visit today, performing an exam and evaluation, ordering and independently evaluating labs, tests, and/or procedures, prescribing any recommended new medications as noted above, providing any pertinent counseling and education and recommending further coordination of care. This was discussed with patient in a shared-decision making conversation, and they understand and agreed with plan of care.     Return if symptoms worsen or fail to improve.    ANJELICA Preston.   [facility]    Please note that this dictation was created using voice recognition software. I have made every reasonable attempt to correct obvious errors, but I expect that there are errors of grammar and possibly content that I did not discover before finalizing the note.

## 2023-02-16 ENCOUNTER — HOSPITAL ENCOUNTER (OUTPATIENT)
Dept: LAB | Facility: MEDICAL CENTER | Age: 33
End: 2023-02-16
Payer: COMMERCIAL

## 2023-02-16 DIAGNOSIS — Z13.21 ENCOUNTER FOR VITAMIN DEFICIENCY SCREENING: ICD-10-CM

## 2023-02-16 DIAGNOSIS — Z00.00 WELLNESS EXAMINATION: ICD-10-CM

## 2023-02-16 DIAGNOSIS — Z13.1 DIABETES MELLITUS SCREENING: ICD-10-CM

## 2023-02-16 DIAGNOSIS — R14.0 BLOATING: ICD-10-CM

## 2023-02-16 DIAGNOSIS — Z13.6 SCREENING FOR CARDIOVASCULAR CONDITION: ICD-10-CM

## 2023-02-16 DIAGNOSIS — K62.89 RECTAL PAIN: ICD-10-CM

## 2023-02-16 DIAGNOSIS — R10.11 RIGHT UPPER QUADRANT PAIN: ICD-10-CM

## 2023-02-16 DIAGNOSIS — Z13.29 SCREENING FOR THYROID DISORDER: ICD-10-CM

## 2023-02-16 DIAGNOSIS — Z13.0 SCREENING FOR DEFICIENCY ANEMIA: ICD-10-CM

## 2023-02-16 LAB
25(OH)D3 SERPL-MCNC: 28 NG/ML (ref 30–100)
ALBUMIN SERPL BCP-MCNC: 4.5 G/DL (ref 3.2–4.9)
ALBUMIN/GLOB SERPL: 1.7 G/DL
ALP SERPL-CCNC: 60 U/L (ref 30–99)
ALT SERPL-CCNC: 22 U/L (ref 2–50)
AMYLASE SERPL-CCNC: 63 U/L (ref 20–103)
ANION GAP SERPL CALC-SCNC: 8 MMOL/L (ref 7–16)
AST SERPL-CCNC: 23 U/L (ref 12–45)
BASOPHILS # BLD AUTO: 0.6 % (ref 0–1.8)
BASOPHILS # BLD: 0.03 K/UL (ref 0–0.12)
BILIRUB SERPL-MCNC: 0.3 MG/DL (ref 0.1–1.5)
BUN SERPL-MCNC: 21 MG/DL (ref 8–22)
CALCIUM ALBUM COR SERPL-MCNC: 8.8 MG/DL (ref 8.5–10.5)
CALCIUM SERPL-MCNC: 9.2 MG/DL (ref 8.5–10.5)
CHLORIDE SERPL-SCNC: 104 MMOL/L (ref 96–112)
CO2 SERPL-SCNC: 29 MMOL/L (ref 20–33)
CREAT SERPL-MCNC: 0.83 MG/DL (ref 0.5–1.4)
EOSINOPHIL # BLD AUTO: 0.1 K/UL (ref 0–0.51)
EOSINOPHIL NFR BLD: 2.1 % (ref 0–6.9)
ERYTHROCYTE [DISTWIDTH] IN BLOOD BY AUTOMATED COUNT: 43 FL (ref 35.9–50)
EST. AVERAGE GLUCOSE BLD GHB EST-MCNC: 111 MG/DL
GFR SERPLBLD CREATININE-BSD FMLA CKD-EPI: 118 ML/MIN/1.73 M 2
GGT SERPL-CCNC: 15 U/L (ref 7–51)
GLOBULIN SER CALC-MCNC: 2.7 G/DL (ref 1.9–3.5)
GLUCOSE SERPL-MCNC: 118 MG/DL (ref 65–99)
HAV IGM SERPL QL IA: NORMAL
HBA1C MFR BLD: 5.5 % (ref 4–5.6)
HBV CORE IGM SER QL: NORMAL
HBV SURFACE AG SER QL: NORMAL
HCT VFR BLD AUTO: 47.2 % (ref 42–52)
HCV AB SER QL: NORMAL
HGB BLD-MCNC: 16.1 G/DL (ref 14–18)
IMM GRANULOCYTES # BLD AUTO: 0.01 K/UL (ref 0–0.11)
IMM GRANULOCYTES NFR BLD AUTO: 0.2 % (ref 0–0.9)
LIPASE SERPL-CCNC: 51 U/L (ref 11–82)
LYMPHOCYTES # BLD AUTO: 0.96 K/UL (ref 1–4.8)
LYMPHOCYTES NFR BLD: 20 % (ref 22–41)
MCH RBC QN AUTO: 30.7 PG (ref 27–33)
MCHC RBC AUTO-ENTMCNC: 34.1 G/DL (ref 33.7–35.3)
MCV RBC AUTO: 89.9 FL (ref 81.4–97.8)
MONOCYTES # BLD AUTO: 0.29 K/UL (ref 0–0.85)
MONOCYTES NFR BLD AUTO: 6.1 % (ref 0–13.4)
NEUTROPHILS # BLD AUTO: 3.4 K/UL (ref 1.82–7.42)
NEUTROPHILS NFR BLD: 71 % (ref 44–72)
NRBC # BLD AUTO: 0 K/UL
NRBC BLD-RTO: 0 /100 WBC
PLATELET # BLD AUTO: 195 K/UL (ref 164–446)
PMV BLD AUTO: 11.1 FL (ref 9–12.9)
POTASSIUM SERPL-SCNC: 3.6 MMOL/L (ref 3.6–5.5)
PROT SERPL-MCNC: 7.2 G/DL (ref 6–8.2)
RBC # BLD AUTO: 5.25 M/UL (ref 4.7–6.1)
SODIUM SERPL-SCNC: 141 MMOL/L (ref 135–145)
TSH SERPL DL<=0.005 MIU/L-ACNC: 1.04 UIU/ML (ref 0.38–5.33)
WBC # BLD AUTO: 4.8 K/UL (ref 4.8–10.8)

## 2023-02-16 PROCEDURE — 82150 ASSAY OF AMYLASE: CPT

## 2023-02-16 PROCEDURE — 83690 ASSAY OF LIPASE: CPT

## 2023-02-16 PROCEDURE — 82306 VITAMIN D 25 HYDROXY: CPT

## 2023-02-16 PROCEDURE — 80053 COMPREHEN METABOLIC PANEL: CPT

## 2023-02-16 PROCEDURE — 82977 ASSAY OF GGT: CPT

## 2023-02-16 PROCEDURE — 36415 COLL VENOUS BLD VENIPUNCTURE: CPT

## 2023-02-16 PROCEDURE — 84443 ASSAY THYROID STIM HORMONE: CPT

## 2023-02-16 PROCEDURE — 83036 HEMOGLOBIN GLYCOSYLATED A1C: CPT

## 2023-02-16 PROCEDURE — 80074 ACUTE HEPATITIS PANEL: CPT

## 2023-02-16 PROCEDURE — 85025 COMPLETE CBC W/AUTO DIFF WBC: CPT

## 2023-02-17 ENCOUNTER — HOSPITAL ENCOUNTER (OUTPATIENT)
Dept: LAB | Facility: MEDICAL CENTER | Age: 33
End: 2023-02-17
Payer: COMMERCIAL

## 2023-02-17 ENCOUNTER — HOSPITAL ENCOUNTER (OUTPATIENT)
Facility: MEDICAL CENTER | Age: 33
End: 2023-02-17
Payer: COMMERCIAL

## 2023-02-17 DIAGNOSIS — R14.0 BLOATING: ICD-10-CM

## 2023-02-17 DIAGNOSIS — Z00.00 WELLNESS EXAMINATION: ICD-10-CM

## 2023-02-17 DIAGNOSIS — K62.89 RECTAL PAIN: ICD-10-CM

## 2023-02-17 LAB
ALBUMIN SERPL BCP-MCNC: 4.6 G/DL (ref 3.2–4.9)
ALBUMIN/GLOB SERPL: 1.8 G/DL
ALP SERPL-CCNC: 53 U/L (ref 30–99)
ALT SERPL-CCNC: 20 U/L (ref 2–50)
ANION GAP SERPL CALC-SCNC: 9 MMOL/L (ref 7–16)
AST SERPL-CCNC: 16 U/L (ref 12–45)
BILIRUB SERPL-MCNC: 0.4 MG/DL (ref 0.1–1.5)
BUN SERPL-MCNC: 16 MG/DL (ref 8–22)
CALCIUM ALBUM COR SERPL-MCNC: 8.8 MG/DL (ref 8.5–10.5)
CALCIUM SERPL-MCNC: 9.3 MG/DL (ref 8.5–10.5)
CHLORIDE SERPL-SCNC: 104 MMOL/L (ref 96–112)
CHOLEST SERPL-MCNC: 235 MG/DL (ref 100–199)
CO2 SERPL-SCNC: 28 MMOL/L (ref 20–33)
CREAT SERPL-MCNC: 0.89 MG/DL (ref 0.5–1.4)
GFR SERPLBLD CREATININE-BSD FMLA CKD-EPI: 116 ML/MIN/1.73 M 2
GLOBULIN SER CALC-MCNC: 2.5 G/DL (ref 1.9–3.5)
GLUCOSE SERPL-MCNC: 89 MG/DL (ref 65–99)
HDLC SERPL-MCNC: 70 MG/DL
LDLC SERPL CALC-MCNC: 152 MG/DL
POTASSIUM SERPL-SCNC: 4.3 MMOL/L (ref 3.6–5.5)
PROT SERPL-MCNC: 7.1 G/DL (ref 6–8.2)
SODIUM SERPL-SCNC: 141 MMOL/L (ref 135–145)
TRIGL SERPL-MCNC: 63 MG/DL (ref 0–149)

## 2023-02-17 PROCEDURE — 82274 ASSAY TEST FOR BLOOD FECAL: CPT

## 2023-02-17 PROCEDURE — 80053 COMPREHEN METABOLIC PANEL: CPT

## 2023-02-17 PROCEDURE — 36415 COLL VENOUS BLD VENIPUNCTURE: CPT

## 2023-02-17 PROCEDURE — 80061 LIPID PANEL: CPT

## 2023-02-17 PROCEDURE — 83993 ASSAY FOR CALPROTECTIN FECAL: CPT

## 2023-02-18 DIAGNOSIS — K62.89 RECTAL PAIN: ICD-10-CM

## 2023-02-18 LAB — AMBIGUOUS SPECIMEN AMBIS: NORMAL

## 2023-02-20 LAB — CALPROTECTIN STL-MCNT: 16 UG/G

## 2023-02-21 LAB — IMM ASSAY OCC BLD FITOB: NEGATIVE

## 2023-03-08 ENCOUNTER — APPOINTMENT (OUTPATIENT)
Dept: RADIOLOGY | Facility: MEDICAL CENTER | Age: 33
End: 2023-03-08
Payer: COMMERCIAL

## 2023-10-21 ENCOUNTER — HOSPITAL ENCOUNTER (EMERGENCY)
Facility: MEDICAL CENTER | Age: 33
End: 2023-10-21
Attending: STUDENT IN AN ORGANIZED HEALTH CARE EDUCATION/TRAINING PROGRAM
Payer: COMMERCIAL

## 2023-10-21 VITALS
OXYGEN SATURATION: 92 % | SYSTOLIC BLOOD PRESSURE: 119 MMHG | HEIGHT: 78 IN | WEIGHT: 200 LBS | DIASTOLIC BLOOD PRESSURE: 81 MMHG | BODY MASS INDEX: 23.14 KG/M2 | TEMPERATURE: 98.4 F | HEART RATE: 100 BPM | RESPIRATION RATE: 20 BRPM

## 2023-10-21 DIAGNOSIS — E86.0 DEHYDRATION: ICD-10-CM

## 2023-10-21 DIAGNOSIS — F10.10 ALCOHOL ABUSE: ICD-10-CM

## 2023-10-21 LAB
ALBUMIN SERPL BCP-MCNC: 5.1 G/DL (ref 3.2–4.9)
ALBUMIN/GLOB SERPL: 1.9 G/DL
ALP SERPL-CCNC: 64 U/L (ref 30–99)
ALT SERPL-CCNC: 31 U/L (ref 2–50)
ANION GAP SERPL CALC-SCNC: 13 MMOL/L (ref 7–16)
AST SERPL-CCNC: 34 U/L (ref 12–45)
BASOPHILS # BLD AUTO: 0.6 % (ref 0–1.8)
BASOPHILS # BLD: 0.03 K/UL (ref 0–0.12)
BILIRUB SERPL-MCNC: 0.3 MG/DL (ref 0.1–1.5)
BUN SERPL-MCNC: 15 MG/DL (ref 8–22)
CALCIUM ALBUM COR SERPL-MCNC: 8.3 MG/DL (ref 8.5–10.5)
CALCIUM SERPL-MCNC: 9.2 MG/DL (ref 8.5–10.5)
CHLORIDE SERPL-SCNC: 106 MMOL/L (ref 96–112)
CK SERPL-CCNC: 251 U/L (ref 0–154)
CO2 SERPL-SCNC: 24 MMOL/L (ref 20–33)
CREAT SERPL-MCNC: 0.97 MG/DL (ref 0.5–1.4)
EOSINOPHIL # BLD AUTO: 0 K/UL (ref 0–0.51)
EOSINOPHIL NFR BLD: 0 % (ref 0–6.9)
ERYTHROCYTE [DISTWIDTH] IN BLOOD BY AUTOMATED COUNT: 42.8 FL (ref 35.9–50)
GFR SERPLBLD CREATININE-BSD FMLA CKD-EPI: 105 ML/MIN/1.73 M 2
GLOBULIN SER CALC-MCNC: 2.7 G/DL (ref 1.9–3.5)
GLUCOSE SERPL-MCNC: 114 MG/DL (ref 65–99)
HCT VFR BLD AUTO: 47.2 % (ref 42–52)
HGB BLD-MCNC: 16.4 G/DL (ref 14–18)
IMM GRANULOCYTES # BLD AUTO: 0.01 K/UL (ref 0–0.11)
IMM GRANULOCYTES NFR BLD AUTO: 0.2 % (ref 0–0.9)
LYMPHOCYTES # BLD AUTO: 0.41 K/UL (ref 1–4.8)
LYMPHOCYTES NFR BLD: 8.2 % (ref 22–41)
MCH RBC QN AUTO: 30.6 PG (ref 27–33)
MCHC RBC AUTO-ENTMCNC: 34.7 G/DL (ref 32.3–36.5)
MCV RBC AUTO: 88.1 FL (ref 81.4–97.8)
MONOCYTES # BLD AUTO: 0.1 K/UL (ref 0–0.85)
MONOCYTES NFR BLD AUTO: 2 % (ref 0–13.4)
NEUTROPHILS # BLD AUTO: 4.47 K/UL (ref 1.82–7.42)
NEUTROPHILS NFR BLD: 89 % (ref 44–72)
NRBC # BLD AUTO: 0 K/UL
NRBC BLD-RTO: 0 /100 WBC (ref 0–0.2)
PLATELET # BLD AUTO: 194 K/UL (ref 164–446)
PMV BLD AUTO: 10.3 FL (ref 9–12.9)
POTASSIUM SERPL-SCNC: 3.8 MMOL/L (ref 3.6–5.5)
PROT SERPL-MCNC: 7.8 G/DL (ref 6–8.2)
RBC # BLD AUTO: 5.36 M/UL (ref 4.7–6.1)
SODIUM SERPL-SCNC: 143 MMOL/L (ref 135–145)
WBC # BLD AUTO: 5 K/UL (ref 4.8–10.8)

## 2023-10-21 PROCEDURE — 99285 EMERGENCY DEPT VISIT HI MDM: CPT

## 2023-10-21 PROCEDURE — 700101 HCHG RX REV CODE 250: Performed by: STUDENT IN AN ORGANIZED HEALTH CARE EDUCATION/TRAINING PROGRAM

## 2023-10-21 PROCEDURE — 700111 HCHG RX REV CODE 636 W/ 250 OVERRIDE (IP): Performed by: STUDENT IN AN ORGANIZED HEALTH CARE EDUCATION/TRAINING PROGRAM

## 2023-10-21 PROCEDURE — 700105 HCHG RX REV CODE 258: Performed by: STUDENT IN AN ORGANIZED HEALTH CARE EDUCATION/TRAINING PROGRAM

## 2023-10-21 PROCEDURE — 82550 ASSAY OF CK (CPK): CPT

## 2023-10-21 PROCEDURE — 80053 COMPREHEN METABOLIC PANEL: CPT

## 2023-10-21 PROCEDURE — 96375 TX/PRO/DX INJ NEW DRUG ADDON: CPT

## 2023-10-21 PROCEDURE — 85025 COMPLETE CBC W/AUTO DIFF WBC: CPT

## 2023-10-21 PROCEDURE — 36415 COLL VENOUS BLD VENIPUNCTURE: CPT

## 2023-10-21 PROCEDURE — 96365 THER/PROPH/DIAG IV INF INIT: CPT

## 2023-10-21 RX ORDER — SODIUM CHLORIDE, SODIUM LACTATE, POTASSIUM CHLORIDE, CALCIUM CHLORIDE 600; 310; 30; 20 MG/100ML; MG/100ML; MG/100ML; MG/100ML
1000 INJECTION, SOLUTION INTRAVENOUS ONCE
Status: COMPLETED | OUTPATIENT
Start: 2023-10-21 | End: 2023-10-21

## 2023-10-21 RX ORDER — DIAZEPAM 5 MG/ML
5 INJECTION, SOLUTION INTRAMUSCULAR; INTRAVENOUS ONCE
Status: COMPLETED | OUTPATIENT
Start: 2023-10-21 | End: 2023-10-21

## 2023-10-21 RX ADMIN — SODIUM CHLORIDE, POTASSIUM CHLORIDE, SODIUM LACTATE AND CALCIUM CHLORIDE 1000 ML: 600; 310; 30; 20 INJECTION, SOLUTION INTRAVENOUS at 10:27

## 2023-10-21 RX ADMIN — FOLIC ACID: 5 INJECTION, SOLUTION INTRAMUSCULAR; INTRAVENOUS; SUBCUTANEOUS at 10:26

## 2023-10-21 RX ADMIN — DIAZEPAM 5 MG: 5 INJECTION, SOLUTION INTRAMUSCULAR; INTRAVENOUS at 10:59

## 2023-10-21 ASSESSMENT — FIBROSIS 4 INDEX: FIB4 SCORE: 0.61

## 2023-10-21 ASSESSMENT — LIFESTYLE VARIABLES
TOTAL SCORE: 4
EVER HAD A DRINK FIRST THING IN THE MORNING TO STEADY YOUR NERVES TO GET RID OF A HANGOVER: YES
DO YOU DRINK ALCOHOL: YES
AVERAGE NUMBER OF DAYS PER WEEK YOU HAVE A DRINK CONTAINING ALCOHOL: 7
HAVE YOU EVER FELT YOU SHOULD CUT DOWN ON YOUR DRINKING: YES
TOTAL SCORE: 4
TOTAL SCORE: 4
HOW MANY TIMES IN THE PAST YEAR HAVE YOU HAD 5 OR MORE DRINKS IN A DAY: 300
HAVE PEOPLE ANNOYED YOU BY CRITICIZING YOUR DRINKING: YES
EVER FELT BAD OR GUILTY ABOUT YOUR DRINKING: YES
CONSUMPTION TOTAL: POSITIVE
DOES PATIENT WANT TO TALK TO SOMEONE ABOUT QUITTING: YES
ON A TYPICAL DAY WHEN YOU DRINK ALCOHOL HOW MANY DRINKS DO YOU HAVE: 8
DOES PATIENT WANT TO STOP DRINKING: YES

## 2023-10-21 NOTE — ED PROVIDER NOTES
ED Provider Note    CHIEF COMPLAINT  Chief Complaint   Patient presents with    Detox     PT STATES HE WOULD LIKE TO DETOX FROM DRUGS AND ALCOHOL. LAST NIGHT 2 PINTS OF ALCOHOL. PT ENDORSES COCAINE & MARIJUANA. PT STATES HE IS INTERMITTENTLY SOBER.        EXTERNAL RECORDS REVIEWED  PDMP score of 0    HPI/ROS  LIMITATION TO HISTORY   Select: : None  OUTSIDE HISTORIAN(S):      Willem Flowers is a 33 y.o. male who presents seeking medical clearance as he is open to detox from drugs and alcohol.  He tried to check into the [facility] today where he was told to come to the ED first for medical clearance.  He endorses heavy binge of alcohol and cocaine last night.  He reports a longstanding history of drug and alcohol addiction.  He reports multiple small relapses over the past couple of weeks and was approximately 90 days sober prior to that.  He reports alcohol withdrawal in the past where he had tremors and difficulty sleeping.      PAST MEDICAL HISTORY   has a past medical history of Patient denies medical problems.    SURGICAL HISTORY  patient denies any surgical history    FAMILY HISTORY  Family History   Problem Relation Age of Onset    Hypertension Father     Blood Disease Father     No Known Problems Brother     No Known Problems Brother     Breast Cancer Maternal Grandmother     Dementia Maternal Grandfather     Blood Disease Paternal Grandmother     No Known Problems Paternal Grandfather     No Known Problems Daughter     Colorectal Cancer Son     Peritoneal Cancer Son     Tubal Cancer Son     Ovarian Cancer Son        SOCIAL HISTORY  Social History     Tobacco Use    Smoking status: Former     Current packs/day: 0.00     Types: Cigarettes     Quit date: 2020     Years since quitting: 3.8    Smokeless tobacco: Never   Vaping Use    Vaping Use: Former    Quit date: 11/19/2021    Substances: Nicotine, Flavoring    Devices: Disposable   Substance and Sexual Activity    Alcohol use: Yes     Comment:  "LIQUIOR    Drug use: Not Currently     Frequency: 2.0 times per week     Types: Inhaled, Marijuana     Comment: MARIJUANA AND COCAINE    Sexual activity: Not Currently     Partners: Female       CURRENT MEDICATIONS  Home Medications    **Home medications have not yet been reviewed for this encounter**         ALLERGIES  No Known Allergies    PHYSICAL EXAM  VITAL SIGNS: /81   Pulse 100   Temp 36.9 °C (98.4 °F) (Temporal)   Resp 20   Ht 1.981 m (6' 6\")   Wt 90.7 kg (200 lb)   SpO2 92%   BMI 23.11 kg/m²    Physical Exam  Vitals and nursing note reviewed.   Constitutional:       Appearance: He is well-developed. He is not ill-appearing or toxic-appearing.      Comments: Disheveled   HENT:      Head: Normocephalic.      Mouth/Throat:      Mouth: Mucous membranes are dry.   Cardiovascular:      Rate and Rhythm: Regular rhythm. Tachycardia present.      Heart sounds: No murmur heard.  Pulmonary:      Effort: Pulmonary effort is normal.      Breath sounds: Normal breath sounds.   Abdominal:      Palpations: Abdomen is soft.      Tenderness: There is no abdominal tenderness.   Musculoskeletal:         General: Normal range of motion.      Right lower leg: No edema.      Left lower leg: No edema.   Skin:     General: Skin is warm.   Neurological:      General: No focal deficit present.      Mental Status: He is alert and oriented to person, place, and time.         DIAGNOSTIC STUDIES / PROCEDURES      LABS  Labs Reviewed   CBC WITH DIFFERENTIAL - Abnormal; Notable for the following components:       Result Value    Neutrophils-Polys 89.00 (*)     Lymphocytes 8.20 (*)     Lymphs (Absolute) 0.41 (*)     All other components within normal limits   COMP METABOLIC PANEL - Abnormal; Notable for the following components:    Glucose 114 (*)     Correct Calcium 8.3 (*)     Albumin 5.1 (*)     All other components within normal limits   CREATINE KINASE - Abnormal; Notable for the following components:    CPK Total 251 (*)  "    All other components within normal limits   ESTIMATED GFR       COURSE & MEDICAL DECISION MAKING    ED Observation Status? No; Patient does not meet criteria for ED Observation.     INITIAL ASSESSMENT, COURSE AND PLAN  Care Narrative: 33-year-old male with a history of drug and alcohol addiction presents to the ED seeking medical clearance and rehydration prior to enrolling in detox program.  Reports heavy alcohol and cocaine use last night.  On exam he is mildly tachycardic otherwise has normal vital signs he is disheveled and appears dehydrated.  Suspect patient could have electrolyte derangement, CHING, rhabdo in setting of dehydration, alcohol and drug use.  Will obtain basic lab work-up and administer IV rehydration and electrolyte replacement.    1050: Patient requesting to leave at this time.  He reports he feels like he really wants to drink.  I discussed with him that this is not a good idea and that he presented here for detox hoping to refrain from alcohol use.  I will give a small dose of Valium at this time to aid in craving.  Labs have just resulted and are notable for mild elevation of CK with no CHING or severe electrolyte abnormality.  Patient now agreeable to stay to allow further hydration.  He does not want to stay for entire dose of rally bag and I feel that this is reasonable as it is a 2-hour infusion and he is tolerating p.o. he is no longer tachycardic.  We will allow liter bolus of LR to complete and observe for short period after Valium administration to ensure no respiratory depression and will discharge afterwards.    HYDRATION: Based on the patient's presentation of Dehydration the patient was given IV fluids. IV Hydration was used because oral hydration was not adequate alone. Upon recheck following hydration, the patient was improved.      ADDITIONAL PROBLEM LIST  Drug and alcohol addiction  DISPOSITION AND DISCUSSIONS  I have discussed management of the patient with the following  physicians and ROSSI's:      Discussion of management with other QHP or appropriate source(s): None     Escalation of care considered, and ultimately not performed:acute inpatient care management, however at this time, the patient is most appropriate for outpatient management    Barriers to care at this time, including but not limited to: Patient lacks transportation  and suffers from substance addiction .     Decision tools and prescription drugs considered including, but not limited to:  Librium to aid with alcohol withdrawal .    FINAL DIAGNOSIS  1. Alcohol abuse    2. Dehydration           Electronically signed by: Feliciano Cabral M.D., 10/21/2023 10:09 AM

## 2023-10-21 NOTE — ED NOTES
Pt ambulatory to [facility] with steady gait. Changed into a gown. Connected to vitals monitor. Warm blankets provided. Endorses drinking 2 pints of alcohol last night however would like to quit. Chart up for ERP review.

## 2023-10-21 NOTE — ED NOTES
Pt medicated per MAR. Call light within reach. All needs met. Privacy screen continues to be in place. Bolus continues to run

## 2023-10-21 NOTE — ED TRIAGE NOTES
"Chief Complaint   Patient presents with    Detox     PT STATES HE WOULD LIKE TO DETOX FROM DRUGS AND ALCOHOL. LAST NIGHT 2 PINTS OF ALCOHOL. PT ENDORSES COCAINE & MARIJUANA. PT STATES HE IS INTERMITTENTLY SOBER.            PT AMBULATORY TO TRIAGE. Pt AA&O X 4, SEE ABOVE.     PT TO LOBBY . PT EDUCATED ON ALERTING STAFF TO CHANGES IN CONDITION. PT VERBALIZED AN UNDERSTANDING.     /78   Pulse (!) 113   Temp 36.9 °C (98.5 °F) (Temporal)   Resp 18   Ht 1.981 m (6' 6\")   Wt 90.7 kg (200 lb)   SpO2 94%   BMI 23.11 kg/m²     "

## 2023-10-21 NOTE — ED NOTES
Attempted to review discharge instructions however pt states will check my chart and needs to leave now.     Denies further questions at this time. Pt ambulatory out of ER with steady gait.

## 2023-10-21 NOTE — ED NOTES
PIV placed. Labs collected and sent.   Bolus and Rally bag running on pump. This RN let pt know that infusions take about 2 hours to complete. Pt was not happy about waiting that long. States 'Well can I at least have some liquid IV or some aminos or something'   ERP messaged asking if pt ok for PO fluids. This RN let pt know we don't have liquid IV as he's receiving electrolytes though the IV bag that's running however if ERP states ok, this RN will bring water.   Warm blankets applied. TV remote and personal belongings within reach.    Procedure(s): RIGHT BREAST SKIN EXCISIONAL BIOPSY, PORT A CATH INSERTION. Anesthesia Post Evaluation Multimodal analgesia: multimodal analgesia used between 6 hours prior to anesthesia start to PACU discharge Patient location during evaluation: bedside Patient participation: complete - patient participated Level of consciousness: awake and alert Pain score: 0 Airway patency: patent Anesthetic complications: no 
Cardiovascular status: acceptable Respiratory status: acceptable Hydration status: acceptable Comments: Postop CXR done Post anesthesia nausea and vomiting:  none Visit Vitals /51 Pulse 82 Temp 36.8 °C (98.3 °F) Resp 21 Ht 5' 1\" (1.549 m) Wt 112 kg (247 lb) SpO2 100% BMI 46.67 kg/m²

## 2023-10-21 NOTE — ED NOTES
Pt given glass of ice water with tray at bedside and privacy screen placed in front of pt per request.

## 2023-10-21 NOTE — ED NOTES
Pt back on call light stating 'I don't want to be here. I want to go home. '     ERP notified and at bedside speaking with pt now.

## 2024-04-22 ENCOUNTER — APPOINTMENT (OUTPATIENT)
Dept: URGENT CARE | Facility: CLINIC | Age: 34
End: 2024-04-22
Payer: COMMERCIAL

## 2024-04-22 ENCOUNTER — APPOINTMENT (OUTPATIENT)
Dept: RADIOLOGY | Facility: IMAGING CENTER | Age: 34
End: 2024-04-22
Attending: PHYSICIAN ASSISTANT
Payer: COMMERCIAL

## 2024-04-22 ENCOUNTER — OFFICE VISIT (OUTPATIENT)
Dept: URGENT CARE | Facility: CLINIC | Age: 34
End: 2024-04-22
Payer: COMMERCIAL

## 2024-04-22 VITALS
HEIGHT: 78 IN | RESPIRATION RATE: 20 BRPM | SYSTOLIC BLOOD PRESSURE: 116 MMHG | DIASTOLIC BLOOD PRESSURE: 78 MMHG | WEIGHT: 200 LBS | OXYGEN SATURATION: 98 % | BODY MASS INDEX: 23.14 KG/M2 | TEMPERATURE: 98 F | HEART RATE: 62 BPM

## 2024-04-22 DIAGNOSIS — S49.91XA INJURY OF RIGHT SHOULDER, INITIAL ENCOUNTER: ICD-10-CM

## 2024-04-22 DIAGNOSIS — S43.101A SEPARATION OF RIGHT ACROMIOCLAVICULAR JOINT, INITIAL ENCOUNTER: ICD-10-CM

## 2024-04-22 PROCEDURE — 3078F DIAST BP <80 MM HG: CPT | Performed by: PHYSICIAN ASSISTANT

## 2024-04-22 PROCEDURE — 99213 OFFICE O/P EST LOW 20 MIN: CPT | Performed by: PHYSICIAN ASSISTANT

## 2024-04-22 PROCEDURE — 3074F SYST BP LT 130 MM HG: CPT | Performed by: PHYSICIAN ASSISTANT

## 2024-04-22 PROCEDURE — 73030 X-RAY EXAM OF SHOULDER: CPT | Mod: TC,FY,RT | Performed by: PHYSICIAN ASSISTANT

## 2024-04-22 ASSESSMENT — ENCOUNTER SYMPTOMS
TINGLING: 0
FOCAL WEAKNESS: 0
VOMITING: 0
FEVER: 0
FALLS: 1
NAUSEA: 0
SENSORY CHANGE: 0
CHILLS: 0

## 2024-04-22 ASSESSMENT — FIBROSIS 4 INDEX: FIB4 SCORE: 1.12

## 2024-04-22 NOTE — LETTER
April 22, 2024         Patient: Willem Flowers   YOB: 1990   Date of Visit: 4/22/2024           To Whom it May Concern:    Willem Flowers was seen in my clinic on 4/22/2024.       Sincerely,           Ella Kitchen P.A.-C.  Electronically Signed
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
## 2024-04-25 ENCOUNTER — OFFICE VISIT (OUTPATIENT)
Dept: SPORTS MEDICINE | Facility: OTHER | Age: 34
End: 2024-04-25
Attending: PHYSICIAN ASSISTANT
Payer: COMMERCIAL

## 2024-04-25 VITALS
WEIGHT: 200 LBS | BODY MASS INDEX: 23.14 KG/M2 | DIASTOLIC BLOOD PRESSURE: 70 MMHG | HEART RATE: 83 BPM | RESPIRATION RATE: 16 BRPM | SYSTOLIC BLOOD PRESSURE: 116 MMHG | HEIGHT: 78 IN | OXYGEN SATURATION: 93 % | TEMPERATURE: 97.8 F

## 2024-04-25 DIAGNOSIS — S43.102A: ICD-10-CM

## 2024-04-25 PROCEDURE — 3078F DIAST BP <80 MM HG: CPT | Performed by: FAMILY MEDICINE

## 2024-04-25 PROCEDURE — 99214 OFFICE O/P EST MOD 30 MIN: CPT | Performed by: FAMILY MEDICINE

## 2024-04-25 PROCEDURE — 3074F SYST BP LT 130 MM HG: CPT | Performed by: FAMILY MEDICINE

## 2024-04-25 ASSESSMENT — FIBROSIS 4 INDEX: FIB4 SCORE: 1.12
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
## 2024-04-25 NOTE — Clinical Note
We saw Willem for his RIGHT AC joint injury.  Fortunately, he is doing VERY WELL.  We made some recommendations regarding immobilization and avoiding any overhead activity.  Will plan on seeing him back in about 3 weeks to see how things are coming along. Hope you are well!  Respectfully,  FREDY Foreman M.D. [facility] [phone]

## 2024-05-07 ENCOUNTER — OFFICE VISIT (OUTPATIENT)
Dept: MEDICAL GROUP | Facility: IMAGING CENTER | Age: 34
End: 2024-05-07
Payer: COMMERCIAL

## 2024-05-07 VITALS
SYSTOLIC BLOOD PRESSURE: 102 MMHG | WEIGHT: 204 LBS | HEART RATE: 99 BPM | BODY MASS INDEX: 23.6 KG/M2 | DIASTOLIC BLOOD PRESSURE: 70 MMHG | OXYGEN SATURATION: 94 % | TEMPERATURE: 98.4 F | RESPIRATION RATE: 16 BRPM | HEIGHT: 78 IN

## 2024-05-07 DIAGNOSIS — E55.9 VITAMIN D DEFICIENCY: ICD-10-CM

## 2024-05-07 DIAGNOSIS — Z11.3 SCREENING EXAMINATION FOR SEXUALLY TRANSMITTED DISEASE: ICD-10-CM

## 2024-05-07 DIAGNOSIS — Z00.00 WELLNESS EXAMINATION: ICD-10-CM

## 2024-05-07 DIAGNOSIS — F19.10 POLYSUBSTANCE ABUSE (HCC): ICD-10-CM

## 2024-05-07 DIAGNOSIS — E78.00 PURE HYPERCHOLESTEROLEMIA: ICD-10-CM

## 2024-05-07 DIAGNOSIS — F10.10 ALCOHOL ABUSE: ICD-10-CM

## 2024-05-07 PROCEDURE — 3078F DIAST BP <80 MM HG: CPT

## 2024-05-07 PROCEDURE — 99214 OFFICE O/P EST MOD 30 MIN: CPT

## 2024-05-07 PROCEDURE — 3074F SYST BP LT 130 MM HG: CPT

## 2024-05-07 ASSESSMENT — FIBROSIS 4 INDEX: FIB4 SCORE: 1.12

## 2024-05-07 ASSESSMENT — PATIENT HEALTH QUESTIONNAIRE - PHQ9: CLINICAL INTERPRETATION OF PHQ2 SCORE: 4

## 2024-05-07 NOTE — PROGRESS NOTES
"Subjective:     CC:   Chief Complaint   Patient presents with    Follow-Up     Pt report struggle w/ alcohol looking for consult on the medication Naltrexone        HPI:   Willem presents today to discuss:    Polysubstance abuse  Alcohol abuse  34-year-old male with history of substance abuse and alcohol abuse. He was in ER on 2024 after he snorted Wellbutrin and cocaine and used meth, kratom, and alcohol. Patient states he was using these recreationally and \"went a little overboard\"  He is seeing his psychiatrist, Lisa at [facility]. He missed his previous appointment and is scheduled to f/u on .  He is interested in alcohol and kratom cessation. He admits drinking 10 beers with hard liquor on average daily.   He admits going to rehab without success.   He is interested in starting Naltrexone for alcohol cessation.  Denies intent to harm self of others.   Denies agitation, anxiety, restlessness, irritability, alternating feelings of warmth and chills, diaphoresis, dizziness, dysphoria, lethargy, severe headache, insomnia, controlled/involuntary muscle movements, myalgias, nausea, vomiting, insomnia, psychosis, paresthesia, rhinorrhea, vertigo, palpitations, and tremors.      Past Medical History:   Diagnosis Date    Patient denies medical problems      Family History   Problem Relation Age of Onset    Hypertension Father     Blood Disease Father     No Known Problems Brother     No Known Problems Brother     Breast Cancer Maternal Grandmother     Dementia Maternal Grandfather     Blood Disease Paternal Grandmother     No Known Problems Paternal Grandfather     No Known Problems Daughter     Colorectal Cancer Son     Peritoneal Cancer Son     Tubal Cancer Son     Ovarian Cancer Son      History reviewed. No pertinent surgical history.  Social History     Tobacco Use    Smoking status: Former     Current packs/day: 0.00     Types: Cigarettes     Quit date:      Years since quittin.3    Smokeless " "tobacco: Never   Vaping Use    Vaping Use: Former    Quit date: 11/19/2021    Substances: Nicotine, Flavoring    Devices: Disposable   Substance Use Topics    Alcohol use: Yes     Comment: LIQUIOR    Drug use: Yes     Frequency: 2.0 times per week     Types: Inhaled, Marijuana     Comment: MARIJUANA AND COCAINE     Social History     Social History Narrative    Not on file     Current Outpatient Medications Ordered in Epic   Medication Sig Dispense Refill    acetaminophen (TYLENOL) 500 MG Tab Take 1-2 Tabs by mouth every 6 hours as needed. 30 Tab 0    ibuprofen (MOTRIN) 600 MG Tab Take 1 Tab by mouth every 6 hours as needed. 30 Tab 0     No current Epic-ordered facility-administered medications on file.     Patient has no known allergies.    ROS: see hpi  Gen: no fevers/chills  Pulm: no sob, no cough  CV: no chest pain, no palpitations, no edema  GI: no nausea/vomiting, no diarrhea  Skin: no rash    Objective:   Exam:  /70 (BP Location: Left arm, Patient Position: Sitting, BP Cuff Size: Adult)   Pulse 99   Temp 36.9 °C (98.4 °F) (Temporal)   Resp 16   Ht 1.981 m (6' 6\")   Wt 92.5 kg (204 lb)   SpO2 94%   BMI 23.57 kg/m²    Body mass index is 23.57 kg/m².    Gen: Alert and oriented, No apparent distress.  HEENT: Head atraumatic, normocephalic. Pupils equal and round.  Neck: Neck is supple without lymphadenopathy.   Lungs: Normal effort, CTA bilaterally, no wheezes, rhonchi, or rales  CV: Regular rate and rhythm. No murmurs, rubs, or gallops.  ABD: +BS. Non-tender, non-distended. No rebound, rigidity, or guarding.  Ext: No clubbing, cyanosis, edema.    Assessment & Plan:     34 y.o. male with the following -     1. Alcohol abuse  2. Polysubstance abuse (HCC)  Chronic and ongoing condition.   Discussed risk of complications with withdrawals that include encephalopathy, seizures, DT, electrolyte imbalance, cardiac arrhythmias.   Recommend inpatient vs outpatient program for alcohol and polysubstance " treatment.   Pt would like to start Naltrexone, will defer to psychiatry.   Will order labs.   ED symptoms discussed    - Referral to Behavioral Health  - Referral to Psychiatry  - URINE DRUG SCREEN W/CONF (AR); Future  - CBC WITH DIFFERENTIAL; Future  - Comp Metabolic Panel; Future  - Lipid Profile; Future  - TSH WITH REFLEX TO FT4; Future  - VITAMIN D,25 HYDROXY (DEFICIENCY); Future  - HEMOGLOBIN A1C; Future  - VIT B12,  FOLIC ACID    3. Wellness examination  PMH/PSH/FH/Social history reviewed. Medication reconciled. Vaccinations discussed. Previous records and labs reviewed. Discussed age appropriate anticipatory guidance.  Will screen for anemia, thyroid disorder, metabolic disorder, cardiovascular disease, diabetes, and vitamin deficiency. Will order labs.    - CBC WITH DIFFERENTIAL; Future  - Comp Metabolic Panel; Future  - Lipid Profile; Future  - TSH WITH REFLEX TO FT4; Future  - VITAMIN D,25 HYDROXY (DEFICIENCY); Future  - HEMOGLOBIN A1C; Future    4. Pure hypercholesterolemia    - Comp Metabolic Panel; Future  - Lipid Profile; Future    5. Vitamin D deficiency    - VITAMIN D,25 HYDROXY (DEFICIENCY); Future    6. Screening examination for sexually transmitted disease    - Chlamydia/GC, PCR (Urine); Future  - HIV AG/AB Combo Assay Screening; Future  - T.Pallidum AB FRANNIE (Screening); Future  - Trichomonas Vaginalis by TMA; Future  - Hepatitis C Virus Antibody; Future  - HEP B Surface Antibody; Future  - Hep B Core AB Total; Future  - Hep B Surface Antigen; Future    I spent a total of 34 minutes with record review, exam, communication with the patient, communication with other providers, and documentation of this encounter.     Medical Decision Making/Course:  In the course of preparing for this visit with review of the pertinent past medical history, recent and past clinic visits, current medications, and performing chart, immunization, medical history and medication reconciliation, and in the further course  of obtaining the current history pertinent to the clinic visit today, performing an exam and evaluation, ordering and independently evaluating labs, tests, and/or procedures, prescribing any recommended new medications as noted above, providing any pertinent counseling and education and recommending further coordination of care. This was discussed with patient in a shared-decision making conversation, and they understand and agreed with plan of care.     Return if symptoms worsen or fail to improve.    ANJELICA Preston.   [facility]    Please note that this dictation was created using voice recognition software. I have made every reasonable attempt to correct obvious errors, but I expect that there are errors of grammar and possibly content that I did not discover before finalizing the note.

## 2024-05-11 ENCOUNTER — HOSPITAL ENCOUNTER (OUTPATIENT)
Dept: LAB | Facility: MEDICAL CENTER | Age: 34
End: 2024-05-11
Payer: COMMERCIAL

## 2024-05-11 DIAGNOSIS — Z11.3 SCREENING EXAMINATION FOR SEXUALLY TRANSMITTED DISEASE: ICD-10-CM

## 2024-05-11 DIAGNOSIS — Z00.00 WELLNESS EXAMINATION: ICD-10-CM

## 2024-05-11 DIAGNOSIS — F10.10 ALCOHOL ABUSE: ICD-10-CM

## 2024-05-11 DIAGNOSIS — E78.00 PURE HYPERCHOLESTEROLEMIA: ICD-10-CM

## 2024-05-11 DIAGNOSIS — E55.9 VITAMIN D DEFICIENCY: ICD-10-CM

## 2024-05-11 DIAGNOSIS — F19.10 POLYSUBSTANCE ABUSE (HCC): ICD-10-CM

## 2024-05-11 LAB
25(OH)D3 SERPL-MCNC: 31 NG/ML (ref 30–100)
ALBUMIN SERPL BCP-MCNC: 4.7 G/DL (ref 3.2–4.9)
ALBUMIN/GLOB SERPL: 1.9 G/DL
ALP SERPL-CCNC: 56 U/L (ref 30–99)
ALT SERPL-CCNC: 31 U/L (ref 2–50)
ANION GAP SERPL CALC-SCNC: 9 MMOL/L (ref 7–16)
AST SERPL-CCNC: 25 U/L (ref 12–45)
BASOPHILS # BLD AUTO: 1.2 % (ref 0–1.8)
BASOPHILS # BLD: 0.04 K/UL (ref 0–0.12)
BILIRUB SERPL-MCNC: 0.4 MG/DL (ref 0.1–1.5)
BUN SERPL-MCNC: 16 MG/DL (ref 8–22)
CALCIUM ALBUM COR SERPL-MCNC: 8.9 MG/DL (ref 8.5–10.5)
CALCIUM SERPL-MCNC: 9.5 MG/DL (ref 8.5–10.5)
CHLORIDE SERPL-SCNC: 104 MMOL/L (ref 96–112)
CHOLEST SERPL-MCNC: 256 MG/DL (ref 100–199)
CO2 SERPL-SCNC: 27 MMOL/L (ref 20–33)
CREAT SERPL-MCNC: 0.84 MG/DL (ref 0.5–1.4)
EOSINOPHIL # BLD AUTO: 0.05 K/UL (ref 0–0.51)
EOSINOPHIL NFR BLD: 1.4 % (ref 0–6.9)
ERYTHROCYTE [DISTWIDTH] IN BLOOD BY AUTOMATED COUNT: 44.4 FL (ref 35.9–50)
EST. AVERAGE GLUCOSE BLD GHB EST-MCNC: 105 MG/DL
FOLATE SERPL-MCNC: 16.3 NG/ML
GFR SERPLBLD CREATININE-BSD FMLA CKD-EPI: 117 ML/MIN/1.73 M 2
GLOBULIN SER CALC-MCNC: 2.5 G/DL (ref 1.9–3.5)
GLUCOSE SERPL-MCNC: 87 MG/DL (ref 65–99)
HBA1C MFR BLD: 5.3 % (ref 4–5.6)
HBV CORE AB SERPL QL IA: NONREACTIVE
HBV SURFACE AB SERPL IA-ACNC: 278 MIU/ML (ref 0–10)
HBV SURFACE AG SER QL: NORMAL
HCT VFR BLD AUTO: 49.2 % (ref 42–52)
HCV AB SER QL: NORMAL
HDLC SERPL-MCNC: 91 MG/DL
HGB BLD-MCNC: 16.7 G/DL (ref 14–18)
HIV 1+2 AB+HIV1 P24 AG SERPL QL IA: NORMAL
IMM GRANULOCYTES # BLD AUTO: 0.01 K/UL (ref 0–0.11)
IMM GRANULOCYTES NFR BLD AUTO: 0.3 % (ref 0–0.9)
LDLC SERPL CALC-MCNC: 152 MG/DL
LYMPHOCYTES # BLD AUTO: 0.96 K/UL (ref 1–4.8)
LYMPHOCYTES NFR BLD: 27.8 % (ref 22–41)
MCH RBC QN AUTO: 30.7 PG (ref 27–33)
MCHC RBC AUTO-ENTMCNC: 33.9 G/DL (ref 32.3–36.5)
MCV RBC AUTO: 90.4 FL (ref 81.4–97.8)
MONOCYTES # BLD AUTO: 0.31 K/UL (ref 0–0.85)
MONOCYTES NFR BLD AUTO: 9 % (ref 0–13.4)
NEUTROPHILS # BLD AUTO: 2.08 K/UL (ref 1.82–7.42)
NEUTROPHILS NFR BLD: 60.3 % (ref 44–72)
NRBC # BLD AUTO: 0 K/UL
NRBC BLD-RTO: 0 /100 WBC (ref 0–0.2)
PLATELET # BLD AUTO: 201 K/UL (ref 164–446)
PMV BLD AUTO: 10.9 FL (ref 9–12.9)
POTASSIUM SERPL-SCNC: 4.6 MMOL/L (ref 3.6–5.5)
PROT SERPL-MCNC: 7.2 G/DL (ref 6–8.2)
RBC # BLD AUTO: 5.44 M/UL (ref 4.7–6.1)
SODIUM SERPL-SCNC: 140 MMOL/L (ref 135–145)
T PALLIDUM AB SER QL IA: NORMAL
TRIGL SERPL-MCNC: 63 MG/DL (ref 0–149)
TSH SERPL DL<=0.005 MIU/L-ACNC: 1.66 UIU/ML (ref 0.38–5.33)
VIT B12 SERPL-MCNC: 1226 PG/ML (ref 211–911)
WBC # BLD AUTO: 3.5 K/UL (ref 4.8–10.8)

## 2024-05-12 LAB
C TRACH DNA SPEC QL NAA+PROBE: NEGATIVE
N GONORRHOEA DNA SPEC QL NAA+PROBE: NEGATIVE
SPECIMEN SOURCE: NORMAL

## 2024-05-13 LAB
AMPHET CTO UR CFM-MCNC: NEGATIVE NG/ML
BARBITURATES CTO UR CFM-MCNC: NEGATIVE NG/ML
BENZODIAZ CTO UR CFM-MCNC: NEGATIVE NG/ML
CANNABINOIDS CTO UR CFM-MCNC: NEGATIVE NG/ML
COCAINE CTO UR CFM-MCNC: NEGATIVE NG/ML
CREAT UR-MCNC: 64.8 MG/DL (ref 20–400)
DRUG COMMENT 753798: NORMAL
METHADONE CTO UR CFM-MCNC: NEGATIVE NG/ML
OPIATES CTO UR CFM-MCNC: NEGATIVE NG/ML
PCP CTO UR CFM-MCNC: NEGATIVE NG/ML
PROPOXYPH CTO UR CFM-MCNC: NEGATIVE NG/ML

## 2024-05-14 ENCOUNTER — OFFICE VISIT (OUTPATIENT)
Dept: MEDICAL GROUP | Facility: IMAGING CENTER | Age: 34
End: 2024-05-14
Payer: COMMERCIAL

## 2024-05-14 VITALS
HEART RATE: 67 BPM | HEIGHT: 78 IN | SYSTOLIC BLOOD PRESSURE: 100 MMHG | RESPIRATION RATE: 14 BRPM | TEMPERATURE: 97.6 F | BODY MASS INDEX: 23.19 KG/M2 | WEIGHT: 200.4 LBS | OXYGEN SATURATION: 100 % | DIASTOLIC BLOOD PRESSURE: 64 MMHG

## 2024-05-14 DIAGNOSIS — F10.10 ALCOHOL ABUSE: ICD-10-CM

## 2024-05-14 DIAGNOSIS — R79.89 HIGH SERUM VITAMIN B12: ICD-10-CM

## 2024-05-14 DIAGNOSIS — E78.00 PURE HYPERCHOLESTEROLEMIA: ICD-10-CM

## 2024-05-14 PROCEDURE — 3078F DIAST BP <80 MM HG: CPT

## 2024-05-14 PROCEDURE — 99213 OFFICE O/P EST LOW 20 MIN: CPT

## 2024-05-14 PROCEDURE — 3074F SYST BP LT 130 MM HG: CPT

## 2024-05-14 RX ORDER — NALTREXONE HYDROCHLORIDE 50 MG/1
50 TABLET, FILM COATED ORAL DAILY
COMMUNITY
Start: 2024-05-13

## 2024-05-14 RX ORDER — FLUOXETINE HYDROCHLORIDE 20 MG/1
20 CAPSULE ORAL DAILY
COMMUNITY
Start: 2024-03-10

## 2024-05-14 RX ORDER — BUPROPION HYDROCHLORIDE 100 MG/1
100 TABLET, EXTENDED RELEASE ORAL 2 TIMES DAILY
COMMUNITY
Start: 2024-04-01

## 2024-05-14 ASSESSMENT — FIBROSIS 4 INDEX: FIB4 SCORE: 0.76

## 2024-05-14 NOTE — PROGRESS NOTES
Subjective:     CC:   Chief Complaint   Patient presents with    Follow-Up     Lab results 5/11       HPI:   Willem presents today to discuss:    Pure hypercholesterolemia  Established condition. Patient admits lifestyle modification needs work.     Cardiovascular Risk Factors:  1. Smoking status: no  2. Type II Diabetes Mellitus: no  3. Hypertension: no  4. Dyslipidemia: no  5. Family history of early Coronary Artery Disease in a first degree relative (Male less than 55 years of age; Female less than 65 years of age): no  6.  Obesity and/or Metabolic Syndrome: no  7. Sedentary lifestyle: yes  Patient denies blurred blurred, severe headaches, chest pain, chest pressure, dizziness, palpitations, syncope, orthopnea, dyspnea or exertion, or leg swelling.     Latest Reference Range & Units 05/11/24 08:28   Cholesterol,Tot 100 - 199 mg/dL 256 (H)   Triglycerides 0 - 149 mg/dL 63   HDL >=40 mg/dL 91   LDL <100 mg/dL 152 (H)   (H): Data is abnormally high    High Vitamin b12  Admits to drinking energy drinks regularly and 5 MTHF with vit b-complex supplements.      Latest Reference Range & Units 05/11/24 08:28   Vitamin B12 -True Cobalamin 211 - 911 pg/mL 1226 (H)   (H): Data is abnormally high    Polysubstance abuse  Alcohol abuse  Established condition. He is interested in alcohol and kratom cessation. He admits drinking 10 beers with hard liquor on average daily.   He admits going to rehab without success.   He saw his psychiatrist yesterday and is scheduled to start Naltrexone.   Denies intent to harm self of others.   Denies agitation, anxiety, restlessness, irritability, alternating feelings of warmth and chills, diaphoresis, dizziness, dysphoria, lethargy, severe headache, insomnia, controlled/involuntary muscle movements, myalgias, nausea, vomiting, insomnia, psychosis, paresthesia, rhinorrhea, vertigo, palpitations, and tremors.      Past Medical History:   Diagnosis Date    Patient denies medical problems       Family History   Problem Relation Age of Onset    Hypertension Father     Blood Disease Father     No Known Problems Brother     No Known Problems Brother     Breast Cancer Maternal Grandmother     Dementia Maternal Grandfather     Blood Disease Paternal Grandmother     No Known Problems Paternal Grandfather     No Known Problems Daughter     Colorectal Cancer Son     Peritoneal Cancer Son     Tubal Cancer Son     Ovarian Cancer Son      History reviewed. No pertinent surgical history.  Social History     Tobacco Use    Smoking status: Former     Current packs/day: 0.00     Types: Cigarettes     Quit date:      Years since quittin.3    Smokeless tobacco: Never   Vaping Use    Vaping Use: Former    Quit date: 2021    Substances: Nicotine, Flavoring    Devices: Disposable   Substance Use Topics    Alcohol use: Yes     Comment: LIQUIOR    Drug use: Yes     Frequency: 2.0 times per week     Types: Inhaled, Marijuana     Comment: MARIJUANA AND COCAINE     Social History     Social History Narrative    Not on file     Current Outpatient Medications Ordered in Epic   Medication Sig Dispense Refill    buPROPion SR (WELLBUTRIN-SR) 100 MG TABLET SR 12 HR Take 100 mg by mouth 2 times a day.      FLUoxetine (PROZAC) 20 MG Cap Take 20 mg by mouth every day.      naltrexone (DEPADE) 50 MG Tab Take 50 mg by mouth every day.      acetaminophen (TYLENOL) 500 MG Tab Take 1-2 Tabs by mouth every 6 hours as needed. 30 Tab 0    ibuprofen (MOTRIN) 600 MG Tab Take 1 Tab by mouth every 6 hours as needed. 30 Tab 0     No current Epic-ordered facility-administered medications on file.     Patient has no known allergies.    ROS: see hpi  Gen: no fevers/chills  Pulm: no sob, no cough  CV: no chest pain, no palpitations, no edema  GI: no nausea/vomiting, no diarrhea  Skin: no rash    Objective:   Exam:  /64 (BP Location: Left arm, Patient Position: Sitting, BP Cuff Size: Adult)   Pulse 67   Temp 36.4 °C (97.6 °F)  "(Temporal)   Resp 14   Ht 1.981 m (6' 6\")   Wt 90.9 kg (200 lb 6.4 oz)   SpO2 100%   BMI 23.16 kg/m²    Body mass index is 23.16 kg/m².    Gen: Alert and oriented, No apparent distress.  HEENT: Head atraumatic, normocephalic. Pupils equal and round.  Neck: Neck is supple without lymphadenopathy.   Lungs: Normal effort, CTA bilaterally, no wheezes, rhonchi, or rales  CV: Regular rate and rhythm. No murmurs, rubs, or gallops.  ABD: +BS. Non-tender, non-distended. No rebound, rigidity, or guarding.  Ext: No clubbing, cyanosis, edema.    Assessment & Plan:     34 y.o. male with the following -     1. Pure hypercholesterolemia  Established condition. Discussed LDL <100 to reduce heart attacks and marinelli.  Recommendations include lifestyle modification:  -healthy diet that is low in trans and saturated fat and high in fiber and potassium emphasizing on increasing fruits, vegetables, whole grains, poultry, fish and nuts (i.e. DASH diet/Mediterranean)  -reduce salt intake to no more than 2,400 mg/day.   -increase physical activity and start an exercise regimen. Adults should engage in at least 150 minutes/week of moderate-intensity or 75 minutes/week of vigorous-intensity physical activity including resistance exercise which has proven to lower risk of atherosclerotic cardiovascular disease (ASCVD).   -maintain normal BMI and blood pressure control.  -Start omega-3 fatty acid 1 g/day, increase fiber intake, and start probiotic daily supplementation  -increase fiber intake  -or starting medication such as statins to reduce cholesterol level, pt declined  Discussed signs and symptoms of major cardiovascular event and need to present to ED.    2. Alcohol abuse  Will defer to psychiatry  Recommend to f/u with behavioral health    3. High serum vitamin B12  New finding, likely r/t to excess ingestion from energy drink and supplements.   May reduce intake sources    Medical Decision Making/Course:  In the course of preparing " for this visit with review of the pertinent past medical history, recent and past clinic visits, current medications, and performing chart, immunization, medical history and medication reconciliation, and in the further course of obtaining the current history pertinent to the clinic visit today, performing an exam and evaluation, ordering and independently evaluating labs, tests, and/or procedures, prescribing any recommended new medications as noted above, providing any pertinent counseling and education and recommending further coordination of care. This was discussed with patient in a shared-decision making conversation, and they understand and agreed with plan of care.     Return if symptoms worsen or fail to improve.    JESSIE Preston   [facility]    Please note that this dictation was created using voice recognition software. I have made every reasonable attempt to correct obvious errors, but I expect that there are errors of grammar and possibly content that I did not discover before finalizing the note.

## 2024-05-15 LAB
SPEC CONTAINER SPEC: NORMAL
SPECIMEN SOURCE: NORMAL
T VAGINALIS RRNA SPEC QL NAA+PROBE: NEGATIVE

## 2024-07-13 ENCOUNTER — APPOINTMENT (OUTPATIENT)
Dept: RADIOLOGY | Facility: MEDICAL CENTER | Age: 34
End: 2024-07-13
Attending: EMERGENCY MEDICINE

## 2024-07-13 ENCOUNTER — HOSPITAL ENCOUNTER (EMERGENCY)
Facility: MEDICAL CENTER | Age: 34
End: 2024-07-13
Attending: EMERGENCY MEDICINE

## 2024-07-13 VITALS
HEART RATE: 61 BPM | HEIGHT: 78 IN | BODY MASS INDEX: 23.14 KG/M2 | DIASTOLIC BLOOD PRESSURE: 91 MMHG | WEIGHT: 200 LBS | OXYGEN SATURATION: 92 % | TEMPERATURE: 99.4 F | RESPIRATION RATE: 16 BRPM | SYSTOLIC BLOOD PRESSURE: 133 MMHG

## 2024-07-13 DIAGNOSIS — L02.91 ABSCESS: ICD-10-CM

## 2024-07-13 DIAGNOSIS — T07.XXXA MULTIPLE ABRASIONS: ICD-10-CM

## 2024-07-13 DIAGNOSIS — L03.90 CELLULITIS, UNSPECIFIED CELLULITIS SITE: ICD-10-CM

## 2024-07-13 DIAGNOSIS — T30.0 SUPERFICIAL BURN: ICD-10-CM

## 2024-07-13 PROCEDURE — 90471 IMMUNIZATION ADMIN: CPT

## 2024-07-13 PROCEDURE — 303977 HCHG I & D

## 2024-07-13 PROCEDURE — 96372 THER/PROPH/DIAG INJ SC/IM: CPT | Mod: XU

## 2024-07-13 PROCEDURE — 73630 X-RAY EXAM OF FOOT: CPT | Mod: LT

## 2024-07-13 PROCEDURE — 99284 EMERGENCY DEPT VISIT MOD MDM: CPT

## 2024-07-13 PROCEDURE — 700102 HCHG RX REV CODE 250 W/ 637 OVERRIDE(OP): Performed by: EMERGENCY MEDICINE

## 2024-07-13 PROCEDURE — 90715 TDAP VACCINE 7 YRS/> IM: CPT | Performed by: EMERGENCY MEDICINE

## 2024-07-13 PROCEDURE — A9270 NON-COVERED ITEM OR SERVICE: HCPCS | Performed by: EMERGENCY MEDICINE

## 2024-07-13 PROCEDURE — 72170 X-RAY EXAM OF PELVIS: CPT

## 2024-07-13 PROCEDURE — 700101 HCHG RX REV CODE 250: Performed by: EMERGENCY MEDICINE

## 2024-07-13 PROCEDURE — 73610 X-RAY EXAM OF ANKLE: CPT | Mod: LT

## 2024-07-13 PROCEDURE — 700111 HCHG RX REV CODE 636 W/ 250 OVERRIDE (IP): Performed by: EMERGENCY MEDICINE

## 2024-07-13 RX ORDER — CLINDAMYCIN HCL 300 MG
300 CAPSULE ORAL 3 TIMES DAILY
Qty: 30 CAPSULE | Refills: 0 | Status: ACTIVE | OUTPATIENT
Start: 2024-07-13 | End: 2024-07-23

## 2024-07-13 RX ORDER — KETOROLAC TROMETHAMINE 15 MG/ML
15 INJECTION, SOLUTION INTRAMUSCULAR; INTRAVENOUS ONCE
Status: COMPLETED | OUTPATIENT
Start: 2024-07-13 | End: 2024-07-13

## 2024-07-13 RX ORDER — CLINDAMYCIN HCL 150 MG
150 CAPSULE ORAL ONCE
Status: COMPLETED | OUTPATIENT
Start: 2024-07-13 | End: 2024-07-13

## 2024-07-13 RX ORDER — LIDOCAINE HYDROCHLORIDE 20 MG/ML
20 INJECTION, SOLUTION INFILTRATION; PERINEURAL ONCE
Status: COMPLETED | OUTPATIENT
Start: 2024-07-13 | End: 2024-07-13

## 2024-07-13 RX ORDER — OXYCODONE AND ACETAMINOPHEN 5; 325 MG/1; MG/1
1 TABLET ORAL ONCE
Status: COMPLETED | OUTPATIENT
Start: 2024-07-13 | End: 2024-07-13

## 2024-07-13 RX ADMIN — CLOSTRIDIUM TETANI TOXOID ANTIGEN (FORMALDEHYDE INACTIVATED), CORYNEBACTERIUM DIPHTHERIAE TOXOID ANTIGEN (FORMALDEHYDE INACTIVATED), BORDETELLA PERTUSSIS TOXOID ANTIGEN (GLUTARALDEHYDE INACTIVATED), BORDETELLA PERTUSSIS FILAMENTOUS HEMAGGLUTININ ANTIGEN (FORMALDEHYDE INACTIVATED), BORDETELLA PERTUSSIS PERTACTIN ANTIGEN, AND BORDETELLA PERTUSSIS FIMBRIAE 2/3 ANTIGEN 0.5 ML: 5; 2; 2.5; 5; 3; 5 INJECTION, SUSPENSION INTRAMUSCULAR at 10:03

## 2024-07-13 RX ADMIN — LIDOCAINE HYDROCHLORIDE 20 ML: 20 INJECTION, SOLUTION INFILTRATION; PERINEURAL at 09:15

## 2024-07-13 RX ADMIN — KETOROLAC TROMETHAMINE 15 MG: 15 INJECTION, SOLUTION INTRAMUSCULAR; INTRAVENOUS at 09:55

## 2024-07-13 RX ADMIN — OXYCODONE HYDROCHLORIDE AND ACETAMINOPHEN 1 TABLET: 5; 325 TABLET ORAL at 09:53

## 2024-07-13 RX ADMIN — FENTANYL CITRATE 100 MCG: 50 INJECTION, SOLUTION INTRAMUSCULAR; INTRAVENOUS at 09:53

## 2024-07-13 RX ADMIN — CLINDAMYCIN HYDROCHLORIDE 150 MG: 150 CAPSULE ORAL at 09:53

## 2024-07-13 ASSESSMENT — FIBROSIS 4 INDEX: FIB4 SCORE: 0.76

## 2024-07-13 NOTE — ED NOTES
wound cleaned and dressed. Bacitracin applied. Pt was instructed on wound care, wound care supplies given.
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
## 2024-07-13 NOTE — ED NOTES
POC dicussed. Pt was given her breakfast and made aware that nitro will delay her stress test. Pt reports generalized weakness. Pt assisted to the restroom via mary steady. Pt wobbly and weak.
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
## 2024-08-22 ENCOUNTER — HOSPITAL ENCOUNTER (EMERGENCY)
Facility: MEDICAL CENTER | Age: 34
End: 2024-08-22
Payer: MEDICAID

## 2024-08-22 VITALS
RESPIRATION RATE: 18 BRPM | TEMPERATURE: 97 F | BODY MASS INDEX: 23.06 KG/M2 | HEART RATE: 73 BPM | WEIGHT: 199.3 LBS | OXYGEN SATURATION: 91 % | HEIGHT: 78 IN

## 2024-08-22 PROCEDURE — 302449 STATCHG TRIAGE ONLY (STATISTIC)

## 2024-08-22 ASSESSMENT — FIBROSIS 4 INDEX: FIB4 SCORE: 0.76

## 2024-08-22 NOTE — ED TRIAGE NOTES
"Chief Complaint   Patient presents with    Detox     Would like to detox from ETOH and cocaine.  Last drink this morning and cocaine yesterday.       Pulse 73   Temp 36.1 °C (97 °F) (Temporal)   Resp 18   Ht 1.981 m (6' 6\")   Wt 90.4 kg (199 lb 4.7 oz)   SpO2 91%   BMI 23.03 kg/m²     "

## 2024-08-23 NOTE — ED NOTES
Pt called for in waiting room @ 1653 - no response, called for again @ 1702 - no response for 2nd call;

## 2024-09-09 ENCOUNTER — HOSPITAL ENCOUNTER (EMERGENCY)
Facility: MEDICAL CENTER | Age: 34
End: 2024-09-10
Attending: STUDENT IN AN ORGANIZED HEALTH CARE EDUCATION/TRAINING PROGRAM

## 2024-09-09 ENCOUNTER — APPOINTMENT (OUTPATIENT)
Dept: RADIOLOGY | Facility: MEDICAL CENTER | Age: 34
End: 2024-09-09
Attending: STUDENT IN AN ORGANIZED HEALTH CARE EDUCATION/TRAINING PROGRAM

## 2024-09-09 DIAGNOSIS — F41.1 ANXIETY REACTION: ICD-10-CM

## 2024-09-09 DIAGNOSIS — E86.0 DEHYDRATION: ICD-10-CM

## 2024-09-09 DIAGNOSIS — F19.90 SUBSTANCE USE DISORDER: ICD-10-CM

## 2024-09-09 LAB
ALBUMIN SERPL BCP-MCNC: 4.7 G/DL (ref 3.2–4.9)
ALBUMIN/GLOB SERPL: 1.6 G/DL
ALP SERPL-CCNC: 62 U/L (ref 30–99)
ALT SERPL-CCNC: 23 U/L (ref 2–50)
ANION GAP SERPL CALC-SCNC: 17 MMOL/L (ref 7–16)
AST SERPL-CCNC: 23 U/L (ref 12–45)
BASOPHILS # BLD AUTO: 0.6 % (ref 0–1.8)
BASOPHILS # BLD: 0.04 K/UL (ref 0–0.12)
BILIRUB SERPL-MCNC: 0.7 MG/DL (ref 0.1–1.5)
BUN SERPL-MCNC: 14 MG/DL (ref 8–22)
CALCIUM ALBUM COR SERPL-MCNC: 9.3 MG/DL (ref 8.5–10.5)
CALCIUM SERPL-MCNC: 9.9 MG/DL (ref 8.4–10.2)
CHLORIDE SERPL-SCNC: 100 MMOL/L (ref 96–112)
CO2 SERPL-SCNC: 22 MMOL/L (ref 20–33)
CREAT SERPL-MCNC: 0.91 MG/DL (ref 0.5–1.4)
D DIMER PPP IA.FEU-MCNC: <0.27 UG/ML (FEU) (ref 0–0.5)
EKG IMPRESSION: NORMAL
EOSINOPHIL # BLD AUTO: 0 K/UL (ref 0–0.51)
EOSINOPHIL NFR BLD: 0 % (ref 0–6.9)
ERYTHROCYTE [DISTWIDTH] IN BLOOD BY AUTOMATED COUNT: 46.1 FL (ref 35.9–50)
ETHANOL BLD-MCNC: <10.1 MG/DL
GFR SERPLBLD CREATININE-BSD FMLA CKD-EPI: 113 ML/MIN/1.73 M 2
GLOBULIN SER CALC-MCNC: 3 G/DL (ref 1.9–3.5)
GLUCOSE SERPL-MCNC: 113 MG/DL (ref 65–99)
HCT VFR BLD AUTO: 47.8 % (ref 42–52)
HGB BLD-MCNC: 16.8 G/DL (ref 14–18)
IMM GRANULOCYTES # BLD AUTO: 0.02 K/UL (ref 0–0.11)
IMM GRANULOCYTES NFR BLD AUTO: 0.3 % (ref 0–0.9)
LYMPHOCYTES # BLD AUTO: 0.96 K/UL (ref 1–4.8)
LYMPHOCYTES NFR BLD: 14.8 % (ref 22–41)
MAGNESIUM SERPL-MCNC: 2 MG/DL (ref 1.5–2.5)
MCH RBC QN AUTO: 31.3 PG (ref 27–33)
MCHC RBC AUTO-ENTMCNC: 35.1 G/DL (ref 32.3–36.5)
MCV RBC AUTO: 89 FL (ref 81.4–97.8)
MONOCYTES # BLD AUTO: 0.72 K/UL (ref 0–0.85)
MONOCYTES NFR BLD AUTO: 11.1 % (ref 0–13.4)
NEUTROPHILS # BLD AUTO: 4.73 K/UL (ref 1.82–7.42)
NEUTROPHILS NFR BLD: 73.2 % (ref 44–72)
NRBC # BLD AUTO: 0 K/UL
NRBC BLD-RTO: 0 /100 WBC (ref 0–0.2)
PLATELET # BLD AUTO: 219 K/UL (ref 164–446)
PMV BLD AUTO: 9.9 FL (ref 9–12.9)
POTASSIUM SERPL-SCNC: 3.7 MMOL/L (ref 3.6–5.5)
PROT SERPL-MCNC: 7.7 G/DL (ref 6–8.2)
RBC # BLD AUTO: 5.37 M/UL (ref 4.7–6.1)
SODIUM SERPL-SCNC: 139 MMOL/L (ref 135–145)
TROPONIN T SERPL-MCNC: 12 NG/L (ref 6–19)
WBC # BLD AUTO: 6.5 K/UL (ref 4.8–10.8)

## 2024-09-09 PROCEDURE — 99284 EMERGENCY DEPT VISIT MOD MDM: CPT

## 2024-09-09 PROCEDURE — A9270 NON-COVERED ITEM OR SERVICE: HCPCS | Performed by: STUDENT IN AN ORGANIZED HEALTH CARE EDUCATION/TRAINING PROGRAM

## 2024-09-09 PROCEDURE — 93005 ELECTROCARDIOGRAM TRACING: CPT

## 2024-09-09 PROCEDURE — 93005 ELECTROCARDIOGRAM TRACING: CPT | Performed by: STUDENT IN AN ORGANIZED HEALTH CARE EDUCATION/TRAINING PROGRAM

## 2024-09-09 PROCEDURE — 85025 COMPLETE CBC W/AUTO DIFF WBC: CPT

## 2024-09-09 PROCEDURE — 700102 HCHG RX REV CODE 250 W/ 637 OVERRIDE(OP): Performed by: STUDENT IN AN ORGANIZED HEALTH CARE EDUCATION/TRAINING PROGRAM

## 2024-09-09 PROCEDURE — 700105 HCHG RX REV CODE 258: Performed by: STUDENT IN AN ORGANIZED HEALTH CARE EDUCATION/TRAINING PROGRAM

## 2024-09-09 PROCEDURE — 85379 FIBRIN DEGRADATION QUANT: CPT

## 2024-09-09 PROCEDURE — 83735 ASSAY OF MAGNESIUM: CPT

## 2024-09-09 PROCEDURE — 82077 ASSAY SPEC XCP UR&BREATH IA: CPT

## 2024-09-09 PROCEDURE — 80053 COMPREHEN METABOLIC PANEL: CPT

## 2024-09-09 PROCEDURE — 36415 COLL VENOUS BLD VENIPUNCTURE: CPT

## 2024-09-09 PROCEDURE — 84484 ASSAY OF TROPONIN QUANT: CPT

## 2024-09-09 PROCEDURE — 71045 X-RAY EXAM CHEST 1 VIEW: CPT

## 2024-09-09 RX ORDER — DIAZEPAM 2 MG/1
2 TABLET ORAL ONCE
Status: COMPLETED | OUTPATIENT
Start: 2024-09-09 | End: 2024-09-09

## 2024-09-09 RX ORDER — SODIUM CHLORIDE, SODIUM LACTATE, POTASSIUM CHLORIDE, CALCIUM CHLORIDE 600; 310; 30; 20 MG/100ML; MG/100ML; MG/100ML; MG/100ML
1000 INJECTION, SOLUTION INTRAVENOUS ONCE
Status: COMPLETED | OUTPATIENT
Start: 2024-09-09 | End: 2024-09-09

## 2024-09-09 RX ADMIN — SODIUM CHLORIDE, POTASSIUM CHLORIDE, SODIUM LACTATE AND CALCIUM CHLORIDE 1000 ML: 600; 310; 30; 20 INJECTION, SOLUTION INTRAVENOUS at 22:45

## 2024-09-09 RX ADMIN — DIAZEPAM 2 MG: 2 TABLET ORAL at 23:00

## 2024-09-09 ASSESSMENT — FIBROSIS 4 INDEX: FIB4 SCORE: 0.76

## 2024-09-10 VITALS
SYSTOLIC BLOOD PRESSURE: 140 MMHG | HEART RATE: 86 BPM | TEMPERATURE: 98.7 F | BODY MASS INDEX: 22.1 KG/M2 | HEIGHT: 78 IN | OXYGEN SATURATION: 98 % | WEIGHT: 191 LBS | RESPIRATION RATE: 20 BRPM | DIASTOLIC BLOOD PRESSURE: 82 MMHG

## 2024-09-10 NOTE — DISCHARGE INSTRUCTIONS
You were seen in the emergency department for difficulty breathing and anxiety.  Your labs, chest x-ray obtained in the emergency department were all reassuring.  Your symptoms improved with fluids and medication given in the emergency department.    Please follow-up with your primary care physician.

## 2024-09-10 NOTE — ED PROVIDER NOTES
ED Provider Note    CHIEF COMPLAINT  Chief Complaint   Patient presents with    Weakness     PT presents d/t weakness and whole body numbness after using methamphetamine and alcohol last night.        EXTERNAL RECORDS REVIEWED  Reviewed outpatient PCP note.  Patient has a history of polysubstance use disorder, alcohol use, cocaine methamphetamine.  Uses kratom to help with his anxiety.  Reviewed for medical history    HPI/ROS  LIMITATION TO HISTORY   Select: : None  OUTSIDE HISTORIAN(S):  none    Willem Flowers is a 34 y.o. male with past medical history of alcohol use disorder presenting to the emergency department for evaluation of generalized weakness and anxiety.  Patient says that he was binge drinking over the last several days, says that he used methamphetamine that his friend gave him.  Says that he he has been coming down from his 3-day rasmussen, today he became incredibly anxious and felt like he could not catch his breath.  Says he did not use any methamphetamine today.  He has a history of depression anxiety.  Previously on Wellbutrin.  He has a history of alcohol use disorder, was clean and sober for 3-1/2 years until several days ago.  Denies any associated chest pain shortness of breath.  He does endorse some paresthesias in his hands bilaterally and muscle spasms in his hands.  Has been waiting in the waiting room for approximately 3 hours and says that since he has been brought back to a room he he is feeling more calm, shortness of breath is improved but not completely resolved and the muscle spasms and paresthesias in his hands are completely resolved.    PAST MEDICAL HISTORY   has a past medical history of Patient denies medical problems.    SURGICAL HISTORY  patient denies any surgical history    FAMILY HISTORY  Family History   Problem Relation Age of Onset    Hypertension Father     Blood Disease Father     No Known Problems Brother     No Known Problems Brother     Breast Cancer  "Maternal Grandmother     Dementia Maternal Grandfather     Blood Disease Paternal Grandmother     No Known Problems Paternal Grandfather     No Known Problems Daughter     Colorectal Cancer Son     Peritoneal Cancer Son     Tubal Cancer Son     Ovarian Cancer Son        SOCIAL HISTORY  Social History     Tobacco Use    Smoking status: Former     Current packs/day: 0.00     Types: Cigarettes     Quit date:      Years since quittin.6    Smokeless tobacco: Never   Vaping Use    Vaping status: Former    Quit date: 2021    Substances: Nicotine, Flavoring    Devices: Disposable   Substance and Sexual Activity    Alcohol use: Yes     Comment: LIQUIOR    Drug use: Yes     Frequency: 2.0 times per week     Types: Inhaled, Marijuana     Comment: MARIJUANA AND COCAINE    Sexual activity: Not Currently     Partners: Female       CURRENT MEDICATIONS  Home Medications       Reviewed by Wei Cardenas R.N. (Registered Nurse) on 24 at 1732  Med List Status: Not Addressed     Medication Last Dose Status   acetaminophen (TYLENOL) 500 MG Tab  Active   buPROPion SR (WELLBUTRIN-SR) 100 MG TABLET SR 12 HR  Active   FLUoxetine (PROZAC) 20 MG Cap  Active   ibuprofen (MOTRIN) 600 MG Tab  Active   naltrexone (DEPADE) 50 MG Tab  Active                    ALLERGIES  No Known Allergies    PHYSICAL EXAM  VITAL SIGNS: BP (!) 140/82   Pulse 86   Temp 37.1 °C (98.7 °F) (Temporal)   Resp 20   Ht 1.981 m (6' 6\")   Wt 86.6 kg (191 lb)   SpO2 98%   BMI 22.07 kg/m²    General: Well- appearing , non-toxic, no acute distress  Neuro: oriented x 3, moving all extremities.   HEENT:   - Head: Normocephalic, atraumatic  - Eyes: PERRL  - Ears/Nose: normal external nose and ears  - Mouth: moist mucosal membranes, no tongue fasciculations  Resp: clear to auscultation, no increased work of breathing  CV: Tachycardic, regular rhythm, no murmur  Abd: Soft, non-tender, non-distended  Extremities: No peripheral edema  Psych: lucid and " conversational, pleasant cooperative        DIAGNOSTIC STUDIES / PROCEDURES    EKG  My independent EKG interpretation:  Results for orders placed or performed during the hospital encounter of 24   EKG   Result Value Ref Range    Report       [facility] Emergency Dept.    Test Date:  2024  Pt Name:    ROMULO MIRANDA              Department: [facility]  MRN:        [id number]                      Room:  Gender:     Male                         Technician: [id number]  :        1990                   Requested By:ER TRIAGE PROTOCOL  Order #:    [id number]                    Reading MD: Mike Montaño    Measurements  Intervals                                Axis  Rate:       95                           P:          9  TX:         53                           QRS:        70  QRSD:       109                          T:          18  QT:         344  QTc:        433    Interpretive Statements  Sinus rhythm  Short TX interval  ST elev, probable normal early repol pattern  No st or t changes  Electronically Signed On 2024 22:11:43 PDT by Mike Montaño         LABS  Results for orders placed or performed during the hospital encounter of 24   CBC WITH DIFFERENTIAL   Result Value Ref Range    WBC 6.5 4.8 - 10.8 K/uL    RBC 5.37 4.70 - 6.10 M/uL    Hemoglobin 16.8 14.0 - 18.0 g/dL    Hematocrit 47.8 42.0 - 52.0 %    MCV 89.0 81.4 - 97.8 fL    MCH 31.3 27.0 - 33.0 pg    MCHC 35.1 32.3 - 36.5 g/dL    RDW 46.1 35.9 - 50.0 fL    Platelet Count 219 164 - 446 K/uL    MPV 9.9 9.0 - 12.9 fL    Neutrophils-Polys 73.20 (H) 44.00 - 72.00 %    Lymphocytes 14.80 (L) 22.00 - 41.00 %    Monocytes 11.10 0.00 - 13.40 %    Eosinophils 0.00 0.00 - 6.90 %    Basophils 0.60 0.00 - 1.80 %    Immature Granulocytes 0.30 0.00 - 0.90 %    Nucleated RBC 0.00 0.00 - 0.20 /100 WBC    Neutrophils (Absolute) 4.73 1.82 - 7.42 K/uL    Lymphs (Absolute) 0.96 (L) 1.00 - 4.80 K/uL    Monos (Absolute) 0.72 0.00 - 0.85 K/uL     Eos (Absolute) 0.00 0.00 - 0.51 K/uL    Baso (Absolute) 0.04 0.00 - 0.12 K/uL    Immature Granulocytes (abs) 0.02 0.00 - 0.11 K/uL    NRBC (Absolute) 0.00 K/uL   COMP METABOLIC PANEL   Result Value Ref Range    Sodium 139 135 - 145 mmol/L    Potassium 3.7 3.6 - 5.5 mmol/L    Chloride 100 96 - 112 mmol/L    Co2 22 20 - 33 mmol/L    Anion Gap 17.0 (H) 7.0 - 16.0    Glucose 113 (H) 65 - 99 mg/dL    Bun 14 8 - 22 mg/dL    Creatinine 0.91 0.50 - 1.40 mg/dL    Calcium 9.9 8.4 - 10.2 mg/dL    Correct Calcium 9.3 8.5 - 10.5 mg/dL    AST(SGOT) 23 12 - 45 U/L    ALT(SGPT) 23 2 - 50 U/L    Alkaline Phosphatase 62 30 - 99 U/L    Total Bilirubin 0.7 0.1 - 1.5 mg/dL    Albumin 4.7 3.2 - 4.9 g/dL    Total Protein 7.7 6.0 - 8.2 g/dL    Globulin 3.0 1.9 - 3.5 g/dL    A-G Ratio 1.6 g/dL   MAGNESIUM   Result Value Ref Range    Magnesium 2.0 1.5 - 2.5 mg/dL   ETHYL ALCOHOL (BLOOD)   Result Value Ref Range    Diagnostic Alcohol <10.1 <10.1 mg/dL   ESTIMATED GFR   Result Value Ref Range    GFR (CKD-EPI) 113 >60 mL/min/1.73 m 2   TROPONIN   Result Value Ref Range    Troponin T 12 6 - 19 ng/L   D-DIMER   Result Value Ref Range    D-Dimer <0.27 0.00 - 0.50 ug/mL (FEU)   EKG   Result Value Ref Range    Report       [facility] Emergency Dept.    Test Date:  2024  Pt Name:    ROMULO MIRANDA              Department: [facility]  MRN:        [id number]                      Room:  Gender:     Male                         Technician: [id number]  :        1990                   Requested By:ER TRIAGE PROTOCOL  Order #:    [id number]                    Tia MD: Mike Montaño    Measurements  Intervals                                Axis  Rate:       95                           P:          9  DC:         53                           QRS:        70  QRSD:       109                          T:          18  QT:         344  QTc:        433    Interpretive Statements  Sinus rhythm  Short DC interval  ST elev, probable  normal early repol pattern  No st or t changes  Electronically Signed On 09- 22:11:43 PDT by Mike Montaño         RADIOLOGY  I have independently interpreted the diagnostic imaging associated with this visit and am waiting the final reading from the radiologist.   My preliminary interpretation is as follows:   -   Radiologist interpretation:   DX-CHEST-PORTABLE (1 VIEW)   Final Result      No acute cardiopulmonary disease evident.              MEDICAL DECISION MAKING      ED COURSE AND PLAN    Willem Flowers is a 34 y.o. male to the emergency department for shortness of breath, anxiety in the context of recent alcohol rasmussen and methamphetamine use.  On arrival to the emergency department patient was mildly tachycardic in the 110s EKG shows no evidence of acute ischemic changes..  Baseline labs were obtained per triage protocol and were relatively unremarkable, chemistry shows normal electrolytes normal renal function.  Alcohol level is undetectable.  His mag is normal.   Given shortness of breath tachycardia will obtain D-dimer, chest x-ray, troponin, treat patient with IV fluids and Valium to help with his symptoms.    ---Pertinent ED Course---:    10:47 PM I reviewed the patient's old records in Epic, medication list, allergies, past medical history and performed a physical examination.     11:45 PM dimer is negative troponin is negative chest x-ray unremarkable.  All other labs are unremarkable.  His initial tachycardia improved with treatment with IV fluids  And Valium in the emergency department.  I reevaluated him, he says that he is feeling much better after IV hydration.  No indication for further diagnostic workup in the emergency department.  He is appropriate for discharge home.  I did advise on alcohol cessation, drug cessation.      Hydration: Based on the patient's presentation of Tachycardia the patient was given IV fluids. IV Hydration was used because oral hydration was not adequate alone.  Upon recheck following hydration, the patient was stable.    Procedures:    Mike Montaño D.O. spent greater than 3 minutes with the patient explaining the importance of alcohol/meth cessation.       ----------------------------------------------------------------------------------  DISCUSSIONS    I have discussed management of the patient with the following physicians and ROSSI's:      Discussion of management with other [facility] or appropriate source(s):     Escalation of care considered, and ultimately not performed:    Barriers to care at this time, including but not limited to:     Decision tools and prescription drugs considered including, but not limited to:     FINAL IMPRESSION    1. Dehydration    2. Anxiety reaction    3. Substance use disorder        Discharge Medication List as of 9/9/2024 11:59 PM            DISPOSITION    Discharge home, Stable        This chart was dictated using an electronic voice recognition software. The chart has been reviewed and edited but there is still possibility for dictation errors due to limitation of software.    Mike Montaño, DO 9/9/2024

## 2024-09-10 NOTE — ED TRIAGE NOTES
"Chief Complaint   Patient presents with    Weakness     PT presents d/t weakness and whole body numbness after using methamphetamine and alcohol last night.      /84   Pulse (!) 115   Temp 37.1 °C (98.7 °F) (Temporal)   Resp 20   Ht 1.981 m (6' 6\")   Wt 86.6 kg (191 lb)   SpO2 98%   BMI 22.07 kg/m²     "

## 2024-11-05 ENCOUNTER — HOSPITAL ENCOUNTER (EMERGENCY)
Facility: MEDICAL CENTER | Age: 34
End: 2024-11-05
Attending: STUDENT IN AN ORGANIZED HEALTH CARE EDUCATION/TRAINING PROGRAM

## 2024-11-05 VITALS
HEIGHT: 78 IN | TEMPERATURE: 97.9 F | OXYGEN SATURATION: 95 % | BODY MASS INDEX: 24.1 KG/M2 | RESPIRATION RATE: 18 BRPM | HEART RATE: 133 BPM | SYSTOLIC BLOOD PRESSURE: 118 MMHG | WEIGHT: 208.34 LBS | DIASTOLIC BLOOD PRESSURE: 82 MMHG

## 2024-11-05 PROCEDURE — 302449 STATCHG TRIAGE ONLY (STATISTIC)

## 2024-11-05 ASSESSMENT — FIBROSIS 4 INDEX: FIB4 SCORE: 0.83

## 2024-11-06 ENCOUNTER — HOSPITAL ENCOUNTER (EMERGENCY)
Facility: MEDICAL CENTER | Age: 34
End: 2024-11-06
Attending: EMERGENCY MEDICINE

## 2024-11-06 VITALS
SYSTOLIC BLOOD PRESSURE: 133 MMHG | TEMPERATURE: 97.9 F | HEIGHT: 78 IN | WEIGHT: 208.34 LBS | BODY MASS INDEX: 24.1 KG/M2 | DIASTOLIC BLOOD PRESSURE: 86 MMHG | RESPIRATION RATE: 18 BRPM | HEART RATE: 113 BPM | OXYGEN SATURATION: 94 %

## 2024-11-06 DIAGNOSIS — F10.920 ALCOHOLIC INTOXICATION WITHOUT COMPLICATION (HCC): ICD-10-CM

## 2024-11-06 DIAGNOSIS — F15.10 METHAMPHETAMINE ABUSE (HCC): ICD-10-CM

## 2024-11-06 DIAGNOSIS — F10.10 ALCOHOL ABUSE: ICD-10-CM

## 2024-11-06 PROCEDURE — 99284 EMERGENCY DEPT VISIT MOD MDM: CPT

## 2024-11-06 ASSESSMENT — FIBROSIS 4 INDEX: FIB4 SCORE: 0.83

## 2024-11-06 NOTE — ED NOTES
Pt reported that he believed he had other options for detox. Signed AMA. ERP made aware. Education provided to pt including potential for worsening symptoms and/or death. AO4. Ambulatory out of ED w/ steady gait.

## 2024-11-06 NOTE — ED NOTES
Pt ambulatory to green    awake alert Ox4, denies pain at this time, had dizziness x 2 days, speech clear, swallows spontaneously, no neuro deficits noted on exam

## 2024-11-06 NOTE — ED PROVIDER NOTES
ED Provider Note    Patient eloped from the emergency department prior to my assessment.    Electronically signed by: Lai Olea M.D., 11/5/2024 6:41 PM

## 2024-11-06 NOTE — ED PROVIDER NOTES
ER Provider Note    Scribed for Dr. Rai España M.D. by Ervin Zepeda. 11/6/2024  4:09 AM    Primary Care Provider: JESSIE Preston    CHIEF COMPLAINT  Chief Complaint   Patient presents with    Alcohol Intoxication    Drug Abuse    Suicidal Ideation       EXTERNAL RECORDS REVIEWED  Outpatient Notes Patient arrived here yesterday for detox but eloped from the ED before he was able to be seen.     HPI/ROS  LIMITATION TO HISTORY   Select: : None    OUTSIDE HISTORIAN(S):  None.     Willem Flowers is a 34 y.o. male who presents to the ED for evaluation of alcohol detox onset two weeks ago. Patient reports that he was recently at a sober living facility, where he was given medications. He initially presented to [facility] where he states he was denied, and was then checked in to [facility] in [place]. He reports associated night tremors, and he was prescribed valium. He returned to work the day after checking out of that facility and had alcohol cravings, and began drinking again. He describes having intermittent panic attacks, and felt as though he may have seizures. Patient notes that he was also having shortness of breath. He was checked into [facility] four days ago for further help with detox. After starting his medications from that encounter he began to have diarrhea and further symptoms of withdrawal, and began drinking again yesterday. Patient's last drink was last night, and he admits to methamphetamine use last night. Patient currently works in construction. He reports that he was told to present here by a friend for help achieving a four day detox before being allowed to stay with his sober living facility. Patient reports that he does not feel like harming himself at this time.     PAST MEDICAL HISTORY  Past Medical History:   Diagnosis Date    Patient denies medical problems        SURGICAL HISTORY  None reported during today's encounter.     FAMILY  "HISTORY  Family History   Problem Relation Age of Onset    Hypertension Father     Blood Disease Father     No Known Problems Brother     No Known Problems Brother     Breast Cancer Maternal Grandmother     Dementia Maternal Grandfather     Blood Disease Paternal Grandmother     No Known Problems Paternal Grandfather     No Known Problems Daughter     Colorectal Cancer Son     Peritoneal Cancer Son     Tubal Cancer Son     Ovarian Cancer Son        SOCIAL HISTORY   reports that he quit smoking about 4 years ago. His smoking use included cigarettes. He has never used smokeless tobacco. He reports current alcohol use. He reports current drug use. Frequency: 2.00 times per week. Drugs: Inhaled and Marijuana.    CURRENT MEDICATIONS  Discharge Medication List as of 11/6/2024  6:45 AM        CONTINUE these medications which have NOT CHANGED    Details   buPROPion SR (WELLBUTRIN-SR) 100 MG TABLET SR 12 HR Take 100 mg by mouth 2 times a day., Historical Med      FLUoxetine (PROZAC) 20 MG Cap Take 20 mg by mouth every day., Historical Med      naltrexone (DEPADE) 50 MG Tab Take 50 mg by mouth every day., Historical Med      acetaminophen (TYLENOL) 500 MG Tab Take 1-2 Tabs by mouth every 6 hours as needed., Disp-30 Tab, R-0, Print Rx Paper      ibuprofen (MOTRIN) 600 MG Tab Take 1 Tab by mouth every 6 hours as needed., Disp-30 Tab, R-0, Print Rx Paper             ALLERGIES  Patient has no known allergies.    PHYSICAL EXAM  /86   Pulse (!) 113   Temp 36.6 °C (97.9 °F) (Temporal)   Resp 18   Ht 1.981 m (6' 5.99\")   Wt 94.5 kg (208 lb 5.4 oz)   SpO2 94%   BMI 24.08 kg/m²   Constitutional: Alert in no apparent distress.  HENT: No signs of trauma, Bilateral external ears normal, Nose normal.   Eyes: Pupils are equal and reactive, Conjunctiva normal, Non-icteric.   Neck: Normal range of motion, No tenderness, Supple,   Cardiovascular: Tachycardic with regular rhythm, no murmurs.   Thorax & Lungs: Normal breath sounds, " No respiratory distress, No wheezing, No chest tenderness.   Abdomen: Bowel sounds normal, Soft, No tenderness,   Skin: Warm, Dry, No erythema, No rash.   Back: No bony tenderness, No CVA tenderness.   Extremities: No edema, No tenderness, No cyanosis.  Neurologic: Alert , Mild slurring of words.   Psychiatric: Affect normal, Denies SI.     COURSE & MEDICAL DECISION MAKING    ED Observation Status? Yes; I am placing the patient in to an observation status due to a diagnostic uncertainty as well as therapeutic intensity. Patient placed in observation status at 4:09 AM, 11/6/2024.     Observation plan is as follows: Serial reassessments and peer support intervention.     Upon Reevaluation, the patient's condition has: Improved; and will be discharged.    Patient discharged from ED Observation status at 8:28 AM (Time) 11/6/2024 (Date).     INITIAL ASSESSMENT AND PLAN  Care Narrative:       4:09 AM - Patient seen and evaluated at bedside. Patient arrives today seeking assistance with alcohol detox. He has been in and out of sober living facilities and has continued to struggle with drinking alcohol. Discussed plan of care with behavioral health nurse, who informed me that peer recovery will be present in the ED at 11. Patient agrees to plan of care.     8:27 AM - I reevaluated the patient at bedside. I discussed plan for discharge and follow up as outlined below. The patient is stable for discharge at this time and will return for any new or worsening symptoms. Patient verbalizes understanding and support with my plan for discharge.      ADDITIONAL PROBLEM LIST AND DISPOSITION  Patient with alcohol abuse and drug abuse.  Mildly intoxicated on initial presentation and is now sobered up.  Ultimately the patient was can to stay to see his peer group related drug support system out of the hospital.  We have asked given him resources that support his discharge and further drug and alcohol rehabilitation out of the community.   Patient is not wanting kill himself.  Denies any intent.  Wants to stop drinking.               DISPOSITION AND DISCUSSIONS  I have discussed management of the patient with the following physicians and ROSSI's: None.    Discussion of management with other [facility] or appropriate source(s): Behavioral Health gave resources      Escalation of care considered, and ultimately not performed: acute inpatient care management, however at this time, the patient is most appropriate for outpatient management.    Barriers to care at this time, including but not limited to: Patient does not have established PCP, Patient does not have insurance, Patient had difficult affording medications, and Patient lacks financial resources.     Decision tools and prescription drugs considered including, but not limited to:  Alcohol abuse discussed .    The patient will return for new or worsening symptoms and is stable at the time of discharge.    DISPOSITION:  Patient will be discharged home in stable condition.    FOLLOW UP:  JESSIE Preston  [address] [address]  [phone]    In 2 days        OUTPATIENT MEDICATIONS:  Discharge Medication List as of 11/6/2024  6:45 AM           FINAL IMPRESSION   1. Alcoholic intoxication without complication (HCC)    2. Methamphetamine abuse (HCC)    3. Alcohol abuse         Ervin GASTELUM (Chelseaibnegar), am scribing for, and in the presence of, Rai España M.D..    Electronically signed by: Ervin Zepeda (Katia), 11/6/2024    Rai GASTELUM M.D. personally performed the services described in this documentation, as scribed by Ervin Zepeda in my presence, and it is both accurate and complete.    The note accurately reflects work and decisions made by me.  Rai España M.D.  11/6/2024  12:08 PM

## 2024-11-06 NOTE — CONSULTS
Alert Team:    ED Consult not required per ERP; patient admitting bedside RN and ERP that he was informed by a friend to say he was suicidal to obtain CD services, pt denying SI. POC for patient to rest in ER and connect with  later this morning.    0645: Spoke with patient regarding inpt alcohol detox options; patient reports he was prompted to get into 3 day detox program before he would be allowed into sober living program. Patient reports he has [employer] coverage but does not have his card on his person. Informed patient that [facility] has no beds at present but he could go to facility after 9 am when discharges would be scheduled for the day and secure a bed but will need to retrieve his Ins card from . Patient provided with [facility] flyer and 24 hr bus pass.

## 2024-11-06 NOTE — ED NOTES
"Pt denies any SI. Stated that he only stated that earlier to \" get him through faster and into detox\".   "

## 2024-11-06 NOTE — ED TRIAGE NOTES
"34 y.o. male Willem Flowers  Chief Complaint   Patient presents with    Alcohol Intoxication    Drug Abuse    Suicidal Ideation     Patient presented to ED with complaints of  alcohol intoxication and drug abuse. Patient reported he drinks every day 6-7 shots of whisky and beer. Last drink 1 hour PTA, patient abuse methamphetamine and marijuana, last use 1 hour PTA.     Patient seeking help for detox recently and endorses he is having suicidal thoughts because of his personal situation. When ask if he has plan or method in place, stated \"its just thoughts\"    [place] Suicide Severity Rating Scale     Wish to be Dead?: Yes  Suicidal Thoughts: Yes    Suicidal Thoughts with Method Without Specific Plan or Intent to Act: No  Suicidal Intent Without Specific Plan: No  Suicide Intent with Specific Plan: No  Suicide Behavior Question: No  How long ago did you do any of these?:    C-SSRS Risk Level: Low Risk    Additional Suicide Screening Questions    Suspected or Confirmed Suicide Attempted?: No  Harming or killing others?: No    Patient AAO X4 , Respirations even and unlabored on room air , afebrile at this time.     ED RN protocol initiated for low risk SI      /86   Pulse (!) 113   Temp 36.6 °C (97.9 °F) (Temporal)   Resp 18   Ht 1.981 m (6' 5.99\")   Wt 94.5 kg (208 lb 5.4 oz)   SpO2 94%   BMI 24.08 kg/m²     No Known Allergies    Pt made aware of triage process, placed back into lobby, educated pt to tell staff of any worsening of symptoms       "

## 2024-11-06 NOTE — ED TRIAGE NOTES
"Chief Complaint   Patient presents with    Detox     Last drink at noon and Kratom.   Requesting detox, recent relapse.         35 yo male ambulated  to triage for above complaint. Pt was told by [facility] to tell [facility] they were SI/HI to get immediate help. Upon further assessment, pt has relapsed alcohol and kratom.     Pt is alert and orientated, speaking full sentences, follows commands and responds appropriately  to questions.     Patient to [place] [id number]. Reported off to Laurie AVALOS and [facility].     /82   Pulse (!) 133   Temp 36.6 °C (97.9 °F) (Temporal)   Resp 18   Ht 1.981 m (6' 6\")   Wt 94.5 kg (208 lb 5.4 oz)   SpO2 95%   BMI 24.08 kg/m²      "

## 2024-11-06 NOTE — DISCHARGE PLANNING
@1837 - Collaborated with Alert Team and ER staff to develop a comprehensive care and DC plan to address PT's unique needs and chief complaint.  PT's chief complain if ETOH detox and secondary Kratom use.    Bedside RN reports PT is alert, linear, logical and responsive to verbal cues.  Alert RN recommended ETOH recovery resources.  PT has no insurance on record.    @1845 - PT room was empty and consulted with bedside RN and was told PT left AMA.

## 2024-11-06 NOTE — ED NOTES
Pt stated he did have insurance and did not want to wait for peer support. alert team spoke w/ pt.  Pt will plan to get insurance card and f/u w/ [facility]  after 0900 for bed availability.   Erp aware pt to d/c

## 2024-11-06 NOTE — ED NOTES
Pt discharged to home. Pt was given follow up instructions + bus pass. Pt verbalized understanding of all instructions for discharge and is ambulatory out of ED with steady gait. AOx4

## 2025-02-18 ENCOUNTER — HOSPITAL ENCOUNTER (EMERGENCY)
Facility: MEDICAL CENTER | Age: 35
End: 2025-02-18

## 2025-02-18 VITALS
DIASTOLIC BLOOD PRESSURE: 88 MMHG | OXYGEN SATURATION: 95 % | SYSTOLIC BLOOD PRESSURE: 130 MMHG | TEMPERATURE: 97.8 F | HEIGHT: 77 IN | RESPIRATION RATE: 15 BRPM | WEIGHT: 220.02 LBS | BODY MASS INDEX: 25.98 KG/M2 | HEART RATE: 95 BPM

## 2025-02-18 PROCEDURE — 302449 STATCHG TRIAGE ONLY (STATISTIC)

## 2025-02-18 ASSESSMENT — FIBROSIS 4 INDEX: FIB4 SCORE: 0.9

## 2025-02-19 NOTE — ED TRIAGE NOTES
"Chief Complaint   Patient presents with    Sent from Urgent Care     For possible anxiety    Anxiety     Started drinking heavily again yesterday  Feeling anxious   Has been taking multiple supplements and did use Meth last night     /88   Pulse 95   Temp 36.6 °C (97.8 °F) (Temporal)   Resp 15   Ht 1.956 m (6' 5\")   Wt 99.8 kg (220 lb 0.3 oz)   SpO2 95%   BMI 26.09 kg/m²     Pt to ED via [facility] from  for c/o feeling anxious.  Pt states drank last night, used Meth last night, and has been taking multiple pre-workout supplements and is concerned \"triggered my anxiety.\"  Pt denies SI/HI at this time, no visible tremors or N/V at this time.    "

## 2025-05-10 ENCOUNTER — HOSPITAL ENCOUNTER (EMERGENCY)
Facility: MEDICAL CENTER | Age: 35
End: 2025-05-11
Attending: EMERGENCY MEDICINE
Payer: COMMERCIAL

## 2025-05-10 DIAGNOSIS — E83.39 HYPOPHOSPHATEMIA: ICD-10-CM

## 2025-05-10 DIAGNOSIS — E86.0 DEHYDRATION: ICD-10-CM

## 2025-05-10 LAB
ALBUMIN SERPL BCP-MCNC: 4.5 G/DL (ref 3.2–4.9)
ALBUMIN/GLOB SERPL: 1.5 G/DL
ALP SERPL-CCNC: 77 U/L (ref 30–99)
ALT SERPL-CCNC: 61 U/L (ref 2–50)
ANION GAP SERPL CALC-SCNC: 16 MMOL/L (ref 7–16)
AST SERPL-CCNC: 81 U/L (ref 12–45)
BASOPHILS # BLD AUTO: 0.7 % (ref 0–1.8)
BASOPHILS # BLD: 0.06 K/UL (ref 0–0.12)
BILIRUB SERPL-MCNC: 0.7 MG/DL (ref 0.1–1.5)
BUN SERPL-MCNC: 23 MG/DL (ref 8–22)
CALCIUM ALBUM COR SERPL-MCNC: 8.8 MG/DL (ref 8.5–10.5)
CALCIUM SERPL-MCNC: 9.2 MG/DL (ref 8.5–10.5)
CHLORIDE SERPL-SCNC: 103 MMOL/L (ref 96–112)
CO2 SERPL-SCNC: 19 MMOL/L (ref 20–33)
CREAT SERPL-MCNC: 1.08 MG/DL (ref 0.5–1.4)
EKG IMPRESSION: NORMAL
EOSINOPHIL # BLD AUTO: 0.43 K/UL (ref 0–0.51)
EOSINOPHIL NFR BLD: 5.2 % (ref 0–6.9)
ERYTHROCYTE [DISTWIDTH] IN BLOOD BY AUTOMATED COUNT: 42.4 FL (ref 35.9–50)
ETHANOL BLD-MCNC: <10.1 MG/DL
GFR SERPLBLD CREATININE-BSD FMLA CKD-EPI: 92 ML/MIN/1.73 M 2
GLOBULIN SER CALC-MCNC: 3 G/DL (ref 1.9–3.5)
GLUCOSE SERPL-MCNC: 80 MG/DL (ref 65–99)
HCT VFR BLD AUTO: 46.3 % (ref 42–52)
HGB BLD-MCNC: 15.9 G/DL (ref 14–18)
IMM GRANULOCYTES # BLD AUTO: 0.02 K/UL (ref 0–0.11)
IMM GRANULOCYTES NFR BLD AUTO: 0.2 % (ref 0–0.9)
LYMPHOCYTES # BLD AUTO: 1.69 K/UL (ref 1–4.8)
LYMPHOCYTES NFR BLD: 20.3 % (ref 22–41)
MAGNESIUM SERPL-MCNC: 2.1 MG/DL (ref 1.5–2.5)
MCH RBC QN AUTO: 30.1 PG (ref 27–33)
MCHC RBC AUTO-ENTMCNC: 34.3 G/DL (ref 32.3–36.5)
MCV RBC AUTO: 87.7 FL (ref 81.4–97.8)
MONOCYTES # BLD AUTO: 0.76 K/UL (ref 0–0.85)
MONOCYTES NFR BLD AUTO: 9.1 % (ref 0–13.4)
NEUTROPHILS # BLD AUTO: 5.38 K/UL (ref 1.82–7.42)
NEUTROPHILS NFR BLD: 64.5 % (ref 44–72)
NRBC # BLD AUTO: 0 K/UL
NRBC BLD-RTO: 0 /100 WBC (ref 0–0.2)
PHOSPHATE SERPL-MCNC: 1.7 MG/DL (ref 2.5–4.5)
PLATELET # BLD AUTO: 195 K/UL (ref 164–446)
PMV BLD AUTO: 10.1 FL (ref 9–12.9)
POTASSIUM SERPL-SCNC: 3.4 MMOL/L (ref 3.6–5.5)
PROT SERPL-MCNC: 7.5 G/DL (ref 6–8.2)
RBC # BLD AUTO: 5.28 M/UL (ref 4.7–6.1)
SODIUM SERPL-SCNC: 138 MMOL/L (ref 135–145)
WBC # BLD AUTO: 8.3 K/UL (ref 4.8–10.8)

## 2025-05-10 PROCEDURE — 84100 ASSAY OF PHOSPHORUS: CPT

## 2025-05-10 PROCEDURE — 82077 ASSAY SPEC XCP UR&BREATH IA: CPT

## 2025-05-10 PROCEDURE — 83735 ASSAY OF MAGNESIUM: CPT

## 2025-05-10 PROCEDURE — A9270 NON-COVERED ITEM OR SERVICE: HCPCS | Performed by: EMERGENCY MEDICINE

## 2025-05-10 PROCEDURE — 700102 HCHG RX REV CODE 250 W/ 637 OVERRIDE(OP): Performed by: EMERGENCY MEDICINE

## 2025-05-10 PROCEDURE — 93005 ELECTROCARDIOGRAM TRACING: CPT | Mod: TC | Performed by: EMERGENCY MEDICINE

## 2025-05-10 PROCEDURE — 99284 EMERGENCY DEPT VISIT MOD MDM: CPT

## 2025-05-10 PROCEDURE — 700105 HCHG RX REV CODE 258: Performed by: EMERGENCY MEDICINE

## 2025-05-10 PROCEDURE — 36415 COLL VENOUS BLD VENIPUNCTURE: CPT

## 2025-05-10 PROCEDURE — 85025 COMPLETE CBC W/AUTO DIFF WBC: CPT

## 2025-05-10 PROCEDURE — 80053 COMPREHEN METABOLIC PANEL: CPT

## 2025-05-10 RX ORDER — SODIUM CHLORIDE, SODIUM LACTATE, POTASSIUM CHLORIDE, CALCIUM CHLORIDE 600; 310; 30; 20 MG/100ML; MG/100ML; MG/100ML; MG/100ML
1000 INJECTION, SOLUTION INTRAVENOUS ONCE
Status: COMPLETED | OUTPATIENT
Start: 2025-05-10 | End: 2025-05-11

## 2025-05-10 RX ORDER — DIAZEPAM 5 MG/1
10 TABLET ORAL ONCE
Status: DISCONTINUED | OUTPATIENT
Start: 2025-05-10 | End: 2025-05-10

## 2025-05-10 RX ORDER — HYDROXYZINE HYDROCHLORIDE 25 MG/1
25 TABLET, FILM COATED ORAL ONCE
Status: COMPLETED | OUTPATIENT
Start: 2025-05-10 | End: 2025-05-10

## 2025-05-10 RX ADMIN — HYDROXYZINE HYDROCHLORIDE 25 MG: 25 TABLET, FILM COATED ORAL at 23:26

## 2025-05-10 RX ADMIN — SODIUM CHLORIDE, POTASSIUM CHLORIDE, SODIUM LACTATE AND CALCIUM CHLORIDE 1000 ML: 600; 310; 30; 20 INJECTION, SOLUTION INTRAVENOUS at 23:30

## 2025-05-10 ASSESSMENT — LIFESTYLE VARIABLES
NAUSEA AND VOMITING: NO NAUSEA AND NO VOMITING
ANXIETY: MODERATELY ANXIOUS OR GUARDED, SO ANXIETY IS INFERRED
VISUAL DISTURBANCES: NOT PRESENT
TREMOR: TREMOR NOT VISIBLE BUT CAN BE FELT, FINGERTIP TO FINGERTIP
TACTILE DISTURBANCES: MILD ITCHING, PINS AND NEEDLES SENSATION, BURNING OR NUMBNESS
HEADACHE, FULLNESS IN HEAD: NOT PRESENT
TOTAL SCORE: 8
AUDITORY DISTURBANCES: NOT PRESENT
ORIENTATION AND CLOUDING OF SENSORIUM: CANNOT DO SERIAL ADDITIONS OR IS UNCERTAIN ABOUT DATE
PAROXYSMAL SWEATS: NO SWEAT VISIBLE
AGITATION: NORMAL ACTIVITY

## 2025-05-10 ASSESSMENT — FIBROSIS 4 INDEX: FIB4 SCORE: 0.9

## 2025-05-11 VITALS
TEMPERATURE: 98.7 F | BODY MASS INDEX: 26.32 KG/M2 | HEART RATE: 77 BPM | SYSTOLIC BLOOD PRESSURE: 131 MMHG | WEIGHT: 222.88 LBS | HEIGHT: 77 IN | RESPIRATION RATE: 21 BRPM | OXYGEN SATURATION: 97 % | DIASTOLIC BLOOD PRESSURE: 82 MMHG

## 2025-05-11 PROCEDURE — A9270 NON-COVERED ITEM OR SERVICE: HCPCS | Performed by: EMERGENCY MEDICINE

## 2025-05-11 PROCEDURE — 700102 HCHG RX REV CODE 250 W/ 637 OVERRIDE(OP): Performed by: EMERGENCY MEDICINE

## 2025-05-11 RX ADMIN — DIBASIC SODIUM PHOSPHATE, MONOBASIC POTASSIUM PHOSPHATE AND MONOBASIC SODIUM PHOSPHATE 500 MG: 852; 155; 130 TABLET ORAL at 00:38

## 2025-05-11 NOTE — ED NOTES
Pt is awake at this time. Pt stated he feels better and wants to go home-- ERP made aware.  ERP at bedside

## 2025-05-11 NOTE — ED NOTES
Pt brought in from triage awake and ambulatory, pt is hesitant to stay, but agreed to be seen by physician.  Pt in bed, attached to VS monitor call bell in reach.

## 2025-05-11 NOTE — ED PROVIDER NOTES
ED Provider Note    CHIEF COMPLAINT  Chief Complaint   Patient presents with    ETOH Withdrawal     Pt reports he went out last night and has been having benders, 10 drinks. Pt reports he also used cocaine last night. Reporting panic attacks. Pt reports also two nights ago he used meth and alcohol. Pt reports he feels like he is withdrawaling, has withdrawn before. Pt reports he thinks he is dehydrated. Last drink was a few hours ago he had a few shots. Reports he also did not go to sleep last night. GCS 15.        EXTERNAL RECORDS REVIEWED  Seen at [facility] 2025 for alcohol and meth intoxication.    HPI/ROS  LIMITATION TO HISTORY   Select: : None  OUTSIDE HISTORIAN(S):  None    Willem Flowers is a 35 y.o. male who presents to the ER feeling weak and dizzy.  He states for the past few days he has been using a lot of substances notably alcohol, cocaine, meth.  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  He had a few drinks of alcohol.  He feels weak and dizzy, dehydrated.  Unsure if he is feeling signs or symptoms of alcohol withdrawal.  Is requesting his home hydroxyzine.    PAST MEDICAL HISTORY   has a past medical history of Patient denies medical problems.    SURGICAL HISTORY  patient denies any surgical history    FAMILY HISTORY  Family History   Problem Relation Age of Onset    Hypertension Father     Blood Disease Father     No Known Problems Brother     No Known Problems Brother     Breast Cancer Maternal Grandmother     Dementia Maternal Grandfather     Blood Disease Paternal Grandmother     No Known Problems Paternal Grandfather     No Known Problems Daughter     Colorectal Cancer Son     Peritoneal Cancer Son     Tubal Cancer Son     Ovarian Cancer Son        SOCIAL HISTORY  Social History     Tobacco Use    Smoking status: Former     Current packs/day: 0.00     Types: Cigarettes     Quit date:      Years since quittin.3     "Smokeless tobacco: Never   Vaping Use    Vaping status: Every Day    Last attempt to quit: 11/19/2021    Substances: Nicotine, Flavoring    Devices: Disposable   Substance and Sexual Activity    Alcohol use: Yes     Alcohol/week: 6.0 oz     Types: 10 Standard drinks or equivalent per week     Comment: 10    Drug use: Yes     Frequency: 2.0 times per week     Types: Inhaled, Marijuana     Comment: meth and cocaine    Sexual activity: Not Currently     Partners: Female       CURRENT MEDICATIONS  Home Medications       Reviewed by Teresa Brink R.N. (Registered Nurse) on 05/10/25 at 2201  Med List Status: Not Addressed     Medication Last Dose Status   acetaminophen (TYLENOL) 500 MG Tab  Active   buPROPion SR (WELLBUTRIN-SR) 100 MG TABLET SR 12 HR  Active   FLUoxetine (PROZAC) 20 MG Cap  Active   ibuprofen (MOTRIN) 600 MG Tab  Active   naltrexone (DEPADE) 50 MG Tab  Active                    ALLERGIES  No Known Allergies    PHYSICAL EXAM  VITAL SIGNS: /82   Pulse 77   Temp 37.1 °C (98.7 °F) (Temporal)   Resp (!) 21   Ht 1.956 m (6' 5\")   Wt 101 kg (222 lb 14.2 oz)   SpO2 97%   BMI 26.43 kg/m²    General: Laying in stretcher, appears fatigued but alert and no distress  HEENT: NCAT, moist mucous membranes, tongue fasciculations present, pupils equal and reactive  CV: Regular rate rhythm no murmurs  Pulmonary: CTAB normal work of breathing  Abdomen: Soft nondistended nontender  Neuro: No tremors in the upper and lower extremities    EKG/LABS  CBC shows no leukocytosis to suggest infectious process, no anemia, platelets normal.  CMP shows hypokalemia 3.4 mild metabolic acidosis bicarb 19 but normal gap.  Slight uremia BUN 23.  Liver enzymes slight elevated consistent with his alcohol use.  He is hypophosphatemic 1.7 mag is normal.  Alcohol undetectable.    EKG at 2329 NSR rate 73 normal axis, normal intervals QTc 420 ms, T wave inversions subtle in lead III, lead V3, no STEMI.  I have independently " interpreted this EKG    COURSE & MEDICAL DECISION MAKING    ASSESSMENT, COURSE AND PLAN  Care Narrative: Differential includes dehydration, electrolyte derangement, arrhythmia, alcohol intoxication, withdrawal    On arrival the patient is in no distress has normal vital signs, appears fatigued.  Some tongue fasciculations on exam but otherwise no other signs or symptoms of alcohol withdrawal.  Differential above is considered.  IV was placed and he was given 1 L LR bolus, p.o. hydroxyzine.  Labs and EKG obtained.    EKG did not show any signs of acute ischemia no arrhythmias.  Labs notable for hypokalemia and hypophosphatemia.  Little bit of uremia likely dehydration.  Liver enzymes elevated likely from alcohol use.  Ethanol is undetectable.  Cell counts are normal on CBC.  He was feeling a lot better after the hydroxyzine and 1 L LR bolus.  Initially I was going to give him some IV K-Phos replacement however patient was feeling much better and wanted to be discharged as he has work tomorrow.  Thus we switched him to 500 mg oral K-Phos.  He will continue oral intake at home to continue improving his electrolytes.  He was discharged in stable condition with return precautions, did not seem to be having alcohol withdrawal at this time.    DISPOSITION AND DISCUSSIONS  I have discussed management of the patient with the following physicians and ROSSI's: None    Discussion of management with other [facility] or appropriate source(s): None     Escalation of care considered, and ultimately not performed:after discussion with the patient / family, they have elected to decline an escalation in care    Barriers to care at this time, including but not limited to: None.     Decision tools and prescription drugs considered including, but not limited to: Recommended hydration and electrolytes.    FINAL DIAGNOSIS  1. Hypophosphatemia    2. Dehydration         Electronically signed by: Jeb Yap M.D., 5/10/2025 11:05 PM

## 2025-05-11 NOTE — ED TRIAGE NOTES
"Chief Complaint   Patient presents with    ETOH Withdrawal     Pt reports he went out last night and has been having benders, 10 drinks. Pt reports he also used cocaine last night. Reporting panic attacks. Pt reports also two nights ago he used meth and alcohol. Pt reports he feels like he is withdrawaling, has withdrawn before. Pt reports he thinks he is dehydrated. Last drink was a few hours ago he had a few shots. Reports he also did not go to sleep last night. GCS 15.      35 y.o. male ambulatory to triage for above complaint.     Pt is alert and oriented, speaking in full sentences, follows commands and responds appropriately to questions. NAD. Resp are even and unlabored.      Pt placed in lobby. Pt educated on triage process. Pt encouraged to alert staff for any changes.     Patient and staff wearing appropriate PPE    /85   Pulse 86   Temp 37.1 °C (98.7 °F) (Temporal)   Resp 16   Ht 1.956 m (6' 5\")   Wt 101 kg (222 lb 14.2 oz)   SpO2 96%   BMI 26.43 kg/m²      "